# Patient Record
Sex: FEMALE | Race: ASIAN | NOT HISPANIC OR LATINO
[De-identification: names, ages, dates, MRNs, and addresses within clinical notes are randomized per-mention and may not be internally consistent; named-entity substitution may affect disease eponyms.]

---

## 2017-01-31 ENCOUNTER — LABORATORY RESULT (OUTPATIENT)
Age: 47
End: 2017-01-31

## 2017-02-01 LAB
AFP-TM SERPL-MCNC: 2.1 NG/ML
ALBUMIN SERPL ELPH-MCNC: 4.5 G/DL
ALP BLD-CCNC: 51 U/L
ALT SERPL-CCNC: 18 U/L
ANION GAP SERPL CALC-SCNC: 14 MMOL/L
AST SERPL-CCNC: 23 U/L
BASOPHILS # BLD AUTO: 0.02 K/UL
BASOPHILS NFR BLD AUTO: 0.3 %
BILIRUB SERPL-MCNC: 0.7 MG/DL
BUN SERPL-MCNC: 10 MG/DL
CALCIUM SERPL-MCNC: 9.5 MG/DL
CHLORIDE SERPL-SCNC: 100 MMOL/L
CO2 SERPL-SCNC: 25 MMOL/L
CREAT SERPL-MCNC: 0.63 MG/DL
EOSINOPHIL # BLD AUTO: 0.06 K/UL
EOSINOPHIL NFR BLD AUTO: 0.8 %
HBV SURFACE AB SER QL: NONREACTIVE
HBV SURFACE AG SER QL: REACTIVE
HCT VFR BLD CALC: 40 %
HGB BLD-MCNC: 12.5 G/DL
IMM GRANULOCYTES NFR BLD AUTO: 0.4 %
LYMPHOCYTES # BLD AUTO: 2.4 K/UL
LYMPHOCYTES NFR BLD AUTO: 30.8 %
MAN DIFF?: NORMAL
MCHC RBC-ENTMCNC: 21.2 PG
MCHC RBC-ENTMCNC: 31.3 GM/DL
MCV RBC AUTO: 67.8 FL
MONOCYTES # BLD AUTO: 0.59 K/UL
MONOCYTES NFR BLD AUTO: 7.6 %
NEUTROPHILS # BLD AUTO: 4.7 K/UL
NEUTROPHILS NFR BLD AUTO: 60.1 %
PLATELET # BLD AUTO: 246 K/UL
POTASSIUM SERPL-SCNC: 3.9 MMOL/L
PROT SERPL-MCNC: 7.6 G/DL
RBC # BLD: 5.9 M/UL
RBC # FLD: 15.6 %
SODIUM SERPL-SCNC: 139 MMOL/L
WBC # FLD AUTO: 7.8 K/UL

## 2017-02-02 ENCOUNTER — APPOINTMENT (OUTPATIENT)
Age: 47
End: 2017-02-02

## 2017-02-02 VITALS
WEIGHT: 112 LBS | TEMPERATURE: 98.5 F | DIASTOLIC BLOOD PRESSURE: 64 MMHG | HEIGHT: 65 IN | SYSTOLIC BLOOD PRESSURE: 98 MMHG | BODY MASS INDEX: 18.66 KG/M2 | RESPIRATION RATE: 14 BRPM | HEART RATE: 73 BPM

## 2017-02-03 LAB
HBV DNA # SERPL NAA+PROBE: NORMAL IU/ML
HEPB DNA PCR LOG: 4.75

## 2018-01-08 ENCOUNTER — LABORATORY RESULT (OUTPATIENT)
Age: 48
End: 2018-01-08

## 2018-01-25 ENCOUNTER — LABORATORY RESULT (OUTPATIENT)
Age: 48
End: 2018-01-25

## 2018-01-29 LAB
AFP-TM SERPL-MCNC: 2.3 NG/ML
ALBUMIN SERPL ELPH-MCNC: 4.3 G/DL
ALP BLD-CCNC: 50 U/L
ALT SERPL-CCNC: 22 U/L
ANION GAP SERPL CALC-SCNC: 14 MMOL/L
AST SERPL-CCNC: 22 U/L
BASOPHILS # BLD AUTO: 0.04 K/UL
BASOPHILS NFR BLD AUTO: 0.9 %
BILIRUB SERPL-MCNC: 0.3 MG/DL
BUN SERPL-MCNC: 10 MG/DL
CALCIUM SERPL-MCNC: 9.4 MG/DL
CHLORIDE SERPL-SCNC: 101 MMOL/L
CO2 SERPL-SCNC: 25 MMOL/L
CREAT SERPL-MCNC: 0.66 MG/DL
EOSINOPHIL # BLD AUTO: 0.04 K/UL
EOSINOPHIL NFR BLD AUTO: 0.9
HBV DNA # SERPL NAA+PROBE: NORMAL IU/ML
HBV SURFACE AB SER QL: NONREACTIVE
HBV SURFACE AG SER QL: REACTIVE
HCT VFR BLD CALC: 38 %
HEPB DNA PCR LOG: 4.33 LOGIU/ML
HGB BLD-MCNC: 12 G/DL
LYMPHOCYTES # BLD AUTO: 1.95 K/UL
LYMPHOCYTES NFR BLD AUTO: 39.1 %
MAN DIFF?: NORMAL
MCHC RBC-ENTMCNC: 21.5 PG
MCHC RBC-ENTMCNC: 31.6 GM/DL
MCV RBC AUTO: 68.1 FL
MONOCYTES # BLD AUTO: 0.21 K/UL
MONOCYTES NFR BLD AUTO: 4.3 %
NEUTROPHILS # BLD AUTO: 2.43 K/UL
NEUTROPHILS NFR BLD AUTO: 48.7 %
PLATELET # BLD AUTO: 251 K/UL
POTASSIUM SERPL-SCNC: 4.1 MMOL/L
PROT SERPL-MCNC: 7.2 G/DL
RBC # BLD: 5.58 M/UL
RBC # FLD: 15.6 %
SODIUM SERPL-SCNC: 140 MMOL/L
WBC # FLD AUTO: 4.98 K/UL

## 2018-02-05 ENCOUNTER — APPOINTMENT (OUTPATIENT)
Age: 48
End: 2018-02-05
Payer: COMMERCIAL

## 2018-02-05 VITALS
HEIGHT: 65 IN | WEIGHT: 116 LBS | TEMPERATURE: 97.9 F | BODY MASS INDEX: 19.33 KG/M2 | HEART RATE: 61 BPM | RESPIRATION RATE: 14 BRPM | SYSTOLIC BLOOD PRESSURE: 110 MMHG | DIASTOLIC BLOOD PRESSURE: 73 MMHG

## 2018-02-05 PROCEDURE — 99214 OFFICE O/P EST MOD 30 MIN: CPT

## 2018-03-05 ENCOUNTER — APPOINTMENT (OUTPATIENT)
Age: 48
End: 2018-03-05
Payer: COMMERCIAL

## 2018-03-05 PROCEDURE — 91200 LIVER ELASTOGRAPHY: CPT

## 2018-07-23 ENCOUNTER — FORM ENCOUNTER (OUTPATIENT)
Age: 48
End: 2018-07-23

## 2018-07-24 ENCOUNTER — APPOINTMENT (OUTPATIENT)
Dept: ULTRASOUND IMAGING | Facility: CLINIC | Age: 48
End: 2018-07-24
Payer: COMMERCIAL

## 2018-07-24 ENCOUNTER — OUTPATIENT (OUTPATIENT)
Dept: OUTPATIENT SERVICES | Facility: HOSPITAL | Age: 48
LOS: 1 days | End: 2018-07-24
Payer: COMMERCIAL

## 2018-07-24 DIAGNOSIS — Z00.8 ENCOUNTER FOR OTHER GENERAL EXAMINATION: ICD-10-CM

## 2018-07-24 PROCEDURE — 76700 US EXAM ABDOM COMPLETE: CPT

## 2018-07-24 PROCEDURE — 76700 US EXAM ABDOM COMPLETE: CPT | Mod: 26

## 2019-02-20 ENCOUNTER — APPOINTMENT (OUTPATIENT)
Dept: HEPATOLOGY | Facility: CLINIC | Age: 49
End: 2019-02-20

## 2019-06-16 ENCOUNTER — FORM ENCOUNTER (OUTPATIENT)
Age: 49
End: 2019-06-16

## 2019-06-17 ENCOUNTER — OUTPATIENT (OUTPATIENT)
Dept: OUTPATIENT SERVICES | Facility: HOSPITAL | Age: 49
LOS: 1 days | End: 2019-06-17
Payer: COMMERCIAL

## 2019-06-17 ENCOUNTER — APPOINTMENT (OUTPATIENT)
Dept: ULTRASOUND IMAGING | Facility: CLINIC | Age: 49
End: 2019-06-17
Payer: COMMERCIAL

## 2019-06-17 DIAGNOSIS — Z00.8 ENCOUNTER FOR OTHER GENERAL EXAMINATION: ICD-10-CM

## 2019-06-17 PROCEDURE — 76700 US EXAM ABDOM COMPLETE: CPT

## 2019-06-17 PROCEDURE — 76700 US EXAM ABDOM COMPLETE: CPT | Mod: 26

## 2019-06-18 LAB
AFP-TM SERPL-MCNC: 2 NG/ML
BASOPHILS # BLD AUTO: 0.03 K/UL
BASOPHILS NFR BLD AUTO: 0.6 %
EOSINOPHIL # BLD AUTO: 0.08 K/UL
EOSINOPHIL NFR BLD AUTO: 1.5 %
HBV SURFACE AB SER QL: NONREACTIVE
HBV SURFACE AG SER QL: REACTIVE
HCT VFR BLD CALC: 41.9 %
HGB BLD-MCNC: 12.3 G/DL
IMM GRANULOCYTES NFR BLD AUTO: 0.2 %
LYMPHOCYTES # BLD AUTO: 2.34 K/UL
LYMPHOCYTES NFR BLD AUTO: 43 %
MAN DIFF?: NORMAL
MCHC RBC-ENTMCNC: 20.8 PG
MCHC RBC-ENTMCNC: 29.4 GM/DL
MCV RBC AUTO: 70.8 FL
MONOCYTES # BLD AUTO: 0.27 K/UL
MONOCYTES NFR BLD AUTO: 5 %
NEUTROPHILS # BLD AUTO: 2.71 K/UL
NEUTROPHILS NFR BLD AUTO: 49.7 %
PLATELET # BLD AUTO: 295 K/UL
RBC # BLD: 5.92 M/UL
RBC # FLD: 17.2 %
WBC # FLD AUTO: 5.44 K/UL

## 2019-06-19 LAB
HBV DNA # SERPL NAA+PROBE: NORMAL IU/ML
HEPB DNA PCR LOG: 4.35 LOGIU/ML

## 2019-06-24 ENCOUNTER — APPOINTMENT (OUTPATIENT)
Dept: HEPATOLOGY | Facility: CLINIC | Age: 49
End: 2019-06-24
Payer: COMMERCIAL

## 2019-06-24 VITALS
BODY MASS INDEX: 18.99 KG/M2 | HEIGHT: 65 IN | TEMPERATURE: 97.5 F | SYSTOLIC BLOOD PRESSURE: 102 MMHG | RESPIRATION RATE: 14 BRPM | WEIGHT: 114 LBS | DIASTOLIC BLOOD PRESSURE: 70 MMHG | HEART RATE: 73 BPM

## 2019-06-24 LAB
ALBUMIN SERPL ELPH-MCNC: 4.3 G/DL
ALP BLD-CCNC: 49 U/L
ALT SERPL-CCNC: 15 U/L
ANION GAP SERPL CALC-SCNC: 10 MMOL/L
AST SERPL-CCNC: 13 U/L
BILIRUB SERPL-MCNC: 0.5 MG/DL
BUN SERPL-MCNC: 9 MG/DL
CALCIUM SERPL-MCNC: 8.9 MG/DL
CHLORIDE SERPL-SCNC: 104 MMOL/L
CO2 SERPL-SCNC: 26 MMOL/L
CREAT SERPL-MCNC: 0.63 MG/DL
POTASSIUM SERPL-SCNC: 4 MMOL/L
PROT SERPL-MCNC: 6.5 G/DL
SODIUM SERPL-SCNC: 140 MMOL/L

## 2019-06-24 PROCEDURE — 99214 OFFICE O/P EST MOD 30 MIN: CPT

## 2020-01-23 ENCOUNTER — APPOINTMENT (OUTPATIENT)
Dept: HEPATOLOGY | Facility: CLINIC | Age: 50
End: 2020-01-23

## 2021-01-21 ENCOUNTER — APPOINTMENT (OUTPATIENT)
Dept: HEPATOLOGY | Facility: CLINIC | Age: 51
End: 2021-01-21

## 2021-04-06 ENCOUNTER — APPOINTMENT (OUTPATIENT)
Dept: HEPATOLOGY | Facility: CLINIC | Age: 51
End: 2021-04-06
Payer: COMMERCIAL

## 2021-04-06 VITALS
RESPIRATION RATE: 10 BRPM | BODY MASS INDEX: 19.49 KG/M2 | OXYGEN SATURATION: 100 % | TEMPERATURE: 97.4 F | DIASTOLIC BLOOD PRESSURE: 62 MMHG | WEIGHT: 117 LBS | HEIGHT: 65 IN | SYSTOLIC BLOOD PRESSURE: 122 MMHG | HEART RATE: 72 BPM

## 2021-04-06 PROCEDURE — 99215 OFFICE O/P EST HI 40 MIN: CPT

## 2021-04-06 PROCEDURE — 91200 LIVER ELASTOGRAPHY: CPT

## 2021-04-06 PROCEDURE — 99072 ADDL SUPL MATRL&STAF TM PHE: CPT

## 2021-04-06 RX ORDER — YEAST,DRIED (S. CEREVISIAE)
POWDER (GRAM) ORAL
Refills: 0 | Status: ACTIVE | COMMUNITY
Start: 2021-04-06

## 2021-04-06 RX ORDER — VITAMIN E MIXED 1000 UNIT
500-200 CAPSULE ORAL
Refills: 0 | Status: ACTIVE | COMMUNITY
Start: 2021-04-06

## 2021-04-06 RX ORDER — SODIUM SULFATE, POTASSIUM SULFATE, MAGNESIUM SULFATE 17.5; 3.13; 1.6 G/ML; G/ML; G/ML
17.5-3.13-1.6 SOLUTION, CONCENTRATE ORAL
Qty: 354 | Refills: 0 | Status: DISCONTINUED | COMMUNITY
Start: 2018-01-22 | End: 2021-04-06

## 2021-04-06 RX ORDER — MULTIVITAMIN WITH FOLIC ACID 400 MCG
TABLET ORAL
Refills: 0 | Status: ACTIVE | COMMUNITY
Start: 2021-04-06

## 2021-04-06 RX ORDER — OMEGA-3-ACID ETHYL ESTERS CAPSULES 1 G/1
1 CAPSULE, LIQUID FILLED ORAL
Refills: 0 | Status: ACTIVE | COMMUNITY
Start: 2021-04-06

## 2021-04-06 NOTE — ASSESSMENT
[FreeTextEntry1] : Ms. JULIA LOBATO is 50 year old female with H/O Hepatitis BV. She is currently not taking antiviral therapy. She is doing well.\par \par # Abdominal pain – Was not tender and had no complaints today. Denies any co-relation with food, BMs or activity. She started taking Chinese herbal pill for liver health, advised to STOP taking it as he does not recollect the name of it and cannot check the ingredients for possible interactions. Advised to F/U with the PCP if the pain persists.\par \par # HBV - She is Hepatitis Be Antigen Non-Reactive and Hepatitis Be Antibody Reactive. 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 Fibroscan on 04/06/2021 shows none to Mild fibrosis F0-1 (E 3.5 kPa) with Labs done on 03/31/2021 shows HBV-DNA 40214/4.63 log, Hepatitis Be Antigen Non-Reactive and Hepatitis Be Antibody Reactive WDL- CMP, CBC, AFP 2.0, TSH. I would not start therapy at this time as the Liver enzymes are normal and FS <2.\par \par I discussed at length regarding hepatitis B. We spoke about the natural history, modes of transmission, diagnostic evaluation and treatment.  I have explained the risk of development of liver cancer and have recommended imaging every 6 months to screen for primary hepatocellular carcinoma. I have discussed at length regarding treatment. I have explained that once treatment begins, it is likely to be lifelong. I have reviewed parameters for stopping treatment. I have explained that if there is a hepatitis B surface antigen seroconversion with the development of hepatitis B surface antibody, therapy may be discontinued.\par \par # Fatty Liver - Reviewed the Severe Steatosis score of S3 ( dB/m). JULIA was educated about the fatty liver disease. Reviewed the spectrum of disease, the risk of disease progression to developing cirrhosis and the associated complications. Explained the patient may develop liver cancer without cirrhosis and therefore should be under the yearly surveillance with an abdominal ultrasound. Taught back that the best treatment of fatty liver disease is diet and exercise. Discussed the present diet with the patient and recommended the avoidance of fatty foods and to follow a heart healthy diet. Also explained that weight loss may lead to an improvement in the overall underlying liver disease. Taught back the physiology of alcohol abstinence has a important contribution to liver health. \par \par # Hepatic Cysts - US ab done on 03/29/2021 shows WDL – Except Hepatic cysts increased in size from 11 and 5 mmm in 01/25/2018 to 16 and 8 mm. Not mentioned in 2019 US Ab. Educated on the benign nature of the cyst.\par \par # Metabolic Syndrome - Reviewed the Elevated Lipids from labs done on 03/31/2021.Reviewed the spectrum of disease, the risk of disease progression with added risk factors commonly seen in patients with diabetes or those who are overweight or vice versa, a precursor to the development of diabetes as it is a component of the metabolic syndrome. Advised to F/U with PCP/Cardiology for treatment regimen.  \par \par # Immunity -  Will check for HAV Ab with next labs.\par # COL- Reported to be normal in 2019.\par \par PLAN to F/U in 6 months with repeat laboratory tests and sonogram.\par Encouraged to call back in the interim with any issues or concerns so that we can address and assist as required.

## 2021-04-06 NOTE — PHYSICAL EXAM
[Non-Tender] : non-tender [Cognitive Mini-Mental Status Normal?] : Cognitive Mini Mental Status Exam is normal [General Appearance - Alert] : alert [General Appearance - In No Acute Distress] : in no acute distress [Sclera] : the sclera and conjunctiva were normal [PERRL With Normal Accommodation] : pupils were equal in size, round, and reactive to light [Extraocular Movements] : extraocular movements were intact [Outer Ear] : the ears and nose were normal in appearance [Oropharynx] : the oropharynx was normal [Neck Appearance] : the appearance of the neck was normal [Neck Cervical Mass (___cm)] : no neck mass was observed [Jugular Venous Distention Increased] : there was no jugular-venous distention [Thyroid Diffuse Enlargement] : the thyroid was not enlarged [Thyroid Nodule] : there were no palpable thyroid nodules [Auscultation Breath Sounds / Voice Sounds] : lungs were clear to auscultation bilaterally [Heart Rate And Rhythm] : heart rate was normal and rhythm regular [Heart Sounds] : normal S1 and S2 [Heart Sounds Gallop] : no gallops [Murmurs] : no murmurs [Heart Sounds Pericardial Friction Rub] : no pericardial rub [Edema] : there was no peripheral edema [Bowel Sounds] : normal bowel sounds [Abdomen Soft] : soft [Abdomen Tenderness] : non-tender [Abdomen Mass (___ Cm)] : no abdominal mass palpated [Abnormal Walk] : normal gait [Nail Clubbing] : no clubbing  or cyanosis of the fingernails [Musculoskeletal - Swelling] : no joint swelling seen [Motor Tone] : muscle strength and tone were normal [Skin Color & Pigmentation] : normal skin color and pigmentation [Skin Turgor] : normal skin turgor [] : no rash [No Focal Deficits] : no focal deficits [Impaired Insight] : insight and judgment were intact [Oriented To Time, Place, And Person] : oriented to person, place, and time [Affect] : the affect was normal [Cervical Lymph Nodes Enlarged Posterior Bilaterally] : posterior cervical [Supraclavicular Lymph Nodes Enlarged Bilaterally] : supraclavicular [Scleral Icterus] : No Scleral Icterus [Hepatojugular Reflux] : patient did not have a sustained hepatojugular reflux [Spider Angioma] : No spider angioma(s) were observed [Abdominal Bruit] : no abdominal bruit [Abdominal  Ascites] : no ascites [Splenomegaly] : no splenomegaly [Asterixis] : no asterixis observed [Jaundice] : No jaundice [Palmar Erythema] : no Palmar Erythema

## 2021-04-06 NOTE — HISTORY OF PRESENT ILLNESS
[de-identified] : Ms. JULIA LOBATO is 50 year old female who presents for the follow up appointment with H/O Hepatitis BV. She is Hepatitis Be Antigen Non-Reactive and Hepatitis Be Antibody Reactive. She is currently not taking antiviral therapy. She is doing well.\par \par Fibroscan on 04/06/2021 shows F0-1 (E 3.5 kPa) and S3 ( dB/m). \par Fibroscan March 5, 2018 F0 S2 disease. Fibroscan test from 9/2/14 showed F0 disease.\par \par Labs done on 03/31/2021 shows HBV-DNA 54539/4.63 log, Hepatitis Be Antigen Non-Reactive and Hepatitis Be Antibody Reactive WDL- CMP,CBC,AFP 2.0,TSH, Elevated lipid panel. US ab done on 03/29/2021 shows WDL – Except Hepatic cysts increased in size from 11 and 5 mmm in 01/25/2018 to 16 and 8 mm. Not mentioned in 2019 US Ab.\par \par Blood tests June 17, 2019 platelet count 295,000, HPV DNA 87450, hepatitis B surface antigen positive. Abdominal sonogram June 17, 2019 without lesions in the liver. Alpha-fetoprotein 2\par \par Abdominal sonogram January 25, 2018 with hepatic cysts measuring up to 1.2 x 1.1 a 1.2 cm. Blood tests January 25, 2018 creatinine 0.66, ALT 22, AST 22, hepatitis B surface antigen positive, AFP 2.3, HBV DNA 69732 international units\par \par Blood tests from February 1, 2017 ALT 18, AST 23, AFP 2.1, hepatitis B surface antigen positive, HBV DNA is pending. Abdominal ultrasound from January 18, 2017 shows tiny left hepatic cyst.\par \par Blood tests December 31, 2015 hemoglobin 12.4, platelet count 270,000, ALT 17, HBV DNA 6000, alpha-fetoprotein 2.4, ferritin 49. An abdominal sonogram December 30 2015 with a small cyst in the liver measuring 1.5 x 0.9 cm and a 6 x 3 mm cyst in the left lobe of the liver. There were no other lesions noted.\par \par Blood tests July 6, 2015 hemoglobin 12.1, creatinine 0.95, ALT 17, HBV DNA 9350, alpha-fetoprotein 1.8\par Sonogram June 2015 shows no lesions in the liver, a slight reduction in the size of the hepatic cysts\par \par Blood tests from July 21, 2014 alpha-fetoprotein 1.8, hepatitis B surface antigen positive, HBV DNA 53146 international units, ALT 17, creatinine 0.6, hemoglobin 11.4, and platelet count 200,000. Ab sonogram from July 21, 2014 shows a cyst in the left lobe.\par \par Blood tests done September 2012 showed HBV DNA 5851 and an ALT of 23. A CBC with platelets is unremarkable an abdominal sonogram shows a small stable hepatic cyst but otherwise normal.\par \par

## 2021-04-06 NOTE — REVIEW OF SYSTEMS
[Negative] : Psychiatric [Abdominal Pain] : abdominal pain [Vomiting] : no vomiting [Constipation] : no constipation [Diarrhea] : no diarrhea [Heartburn] : no heartburn [Melena] : no melena [FreeTextEntry7] : Right lower quadrant

## 2021-05-26 ENCOUNTER — RESULT REVIEW (OUTPATIENT)
Age: 51
End: 2021-05-26

## 2021-09-30 ENCOUNTER — LABORATORY RESULT (OUTPATIENT)
Age: 51
End: 2021-09-30

## 2021-10-01 ENCOUNTER — NON-APPOINTMENT (OUTPATIENT)
Age: 51
End: 2021-10-01

## 2021-10-01 LAB
AFP-TM SERPL-MCNC: 2.4 NG/ML
ALBUMIN SERPL ELPH-MCNC: 4.7 G/DL
ALP BLD-CCNC: 64 U/L
ALT SERPL-CCNC: 28 U/L
ANION GAP SERPL CALC-SCNC: 13 MMOL/L
AST SERPL-CCNC: 24 U/L
BASOPHILS # BLD AUTO: 0.02 K/UL
BASOPHILS NFR BLD AUTO: 0.4 %
BILIRUB SERPL-MCNC: 0.7 MG/DL
BUN SERPL-MCNC: 11 MG/DL
CALCIUM SERPL-MCNC: 9.5 MG/DL
CHLORIDE SERPL-SCNC: 103 MMOL/L
CO2 SERPL-SCNC: 25 MMOL/L
CREAT SERPL-MCNC: 0.65 MG/DL
EOSINOPHIL # BLD AUTO: 0.07 K/UL
EOSINOPHIL NFR BLD AUTO: 1.3 %
HBV CORE IGG+IGM SER QL: REACTIVE
HBV E AB SER QL: REACTIVE
HBV E AG SER QL: NONREACTIVE
HBV SURFACE AB SER QL: NONREACTIVE
HBV SURFACE AG SER QL: REACTIVE
HCT VFR BLD CALC: 40.7 %
HEPATITIS A IGG ANTIBODY: REACTIVE
HGB BLD-MCNC: 12.3 G/DL
IMM GRANULOCYTES NFR BLD AUTO: 0.2 %
LYMPHOCYTES # BLD AUTO: 1.93 K/UL
LYMPHOCYTES NFR BLD AUTO: 36.6 %
MAN DIFF?: NORMAL
MCHC RBC-ENTMCNC: 20.6 PG
MCHC RBC-ENTMCNC: 30.2 GM/DL
MCV RBC AUTO: 68.3 FL
MONOCYTES # BLD AUTO: 0.33 K/UL
MONOCYTES NFR BLD AUTO: 6.3 %
NEUTROPHILS # BLD AUTO: 2.91 K/UL
NEUTROPHILS NFR BLD AUTO: 55.2 %
PLATELET # BLD AUTO: 238 K/UL
POTASSIUM SERPL-SCNC: 3.9 MMOL/L
PROT SERPL-MCNC: 7.5 G/DL
RBC # BLD: 5.96 M/UL
RBC # FLD: 16.3 %
SODIUM SERPL-SCNC: 141 MMOL/L
WBC # FLD AUTO: 5.27 K/UL

## 2021-10-04 LAB
HBV DNA # SERPL NAA+PROBE: NORMAL IU/ML
HEPB DNA PCR LOG: 4.82 LOG10IU/ML

## 2021-10-05 ENCOUNTER — NON-APPOINTMENT (OUTPATIENT)
Age: 51
End: 2021-10-05

## 2021-10-06 ENCOUNTER — NON-APPOINTMENT (OUTPATIENT)
Age: 51
End: 2021-10-06

## 2021-10-07 ENCOUNTER — APPOINTMENT (OUTPATIENT)
Dept: HEPATOLOGY | Facility: CLINIC | Age: 51
End: 2021-10-07
Payer: COMMERCIAL

## 2021-10-07 VITALS
HEART RATE: 93 BPM | DIASTOLIC BLOOD PRESSURE: 77 MMHG | BODY MASS INDEX: 19.83 KG/M2 | HEIGHT: 65 IN | WEIGHT: 119 LBS | TEMPERATURE: 97.6 F | RESPIRATION RATE: 10 BRPM | SYSTOLIC BLOOD PRESSURE: 111 MMHG

## 2021-10-07 PROCEDURE — 99214 OFFICE O/P EST MOD 30 MIN: CPT

## 2021-12-16 ENCOUNTER — APPOINTMENT (OUTPATIENT)
Dept: HEPATOLOGY | Facility: CLINIC | Age: 51
End: 2021-12-16

## 2022-06-10 ENCOUNTER — INPATIENT (INPATIENT)
Facility: HOSPITAL | Age: 52
LOS: 1 days | Discharge: HOME | DRG: 690 | End: 2022-06-12
Attending: STUDENT IN AN ORGANIZED HEALTH CARE EDUCATION/TRAINING PROGRAM | Admitting: STUDENT IN AN ORGANIZED HEALTH CARE EDUCATION/TRAINING PROGRAM
Payer: COMMERCIAL

## 2022-06-10 VITALS
TEMPERATURE: 98 F | WEIGHT: 117.95 LBS | HEART RATE: 88 BPM | SYSTOLIC BLOOD PRESSURE: 113 MMHG | RESPIRATION RATE: 18 BRPM | HEIGHT: 61 IN | DIASTOLIC BLOOD PRESSURE: 77 MMHG

## 2022-06-10 PROCEDURE — 99285 EMERGENCY DEPT VISIT HI MDM: CPT

## 2022-06-11 DIAGNOSIS — N30.00 ACUTE CYSTITIS WITHOUT HEMATURIA: ICD-10-CM

## 2022-06-11 LAB
ALBUMIN SERPL ELPH-MCNC: 4.5 G/DL — SIGNIFICANT CHANGE UP (ref 3.3–5)
ALP SERPL-CCNC: 76 U/L — SIGNIFICANT CHANGE UP (ref 40–120)
ALT FLD-CCNC: 22 U/L — SIGNIFICANT CHANGE UP (ref 10–45)
ANION GAP SERPL CALC-SCNC: 13 MMOL/L — SIGNIFICANT CHANGE UP (ref 5–17)
ANISOCYTOSIS BLD QL: SLIGHT — SIGNIFICANT CHANGE UP
APPEARANCE UR: ABNORMAL
AST SERPL-CCNC: 19 U/L — SIGNIFICANT CHANGE UP (ref 10–40)
BACTERIA # UR AUTO: NEGATIVE — SIGNIFICANT CHANGE UP
BASE EXCESS BLDV CALC-SCNC: 1.3 MMOL/L — SIGNIFICANT CHANGE UP (ref -2–2)
BASE EXCESS BLDV CALC-SCNC: 4.9 MMOL/L — HIGH (ref -2–2)
BASOPHILS # BLD AUTO: 0 K/UL — SIGNIFICANT CHANGE UP (ref 0–0.2)
BASOPHILS NFR BLD AUTO: 0 % — SIGNIFICANT CHANGE UP (ref 0–2)
BILIRUB SERPL-MCNC: 0.8 MG/DL — SIGNIFICANT CHANGE UP (ref 0.2–1.2)
BILIRUB UR-MCNC: NEGATIVE — SIGNIFICANT CHANGE UP
BUN SERPL-MCNC: 9 MG/DL — SIGNIFICANT CHANGE UP (ref 7–23)
CA-I SERPL-SCNC: 1.23 MMOL/L — SIGNIFICANT CHANGE UP (ref 1.15–1.33)
CA-I SERPL-SCNC: 1.24 MMOL/L — SIGNIFICANT CHANGE UP (ref 1.15–1.33)
CALCIUM SERPL-MCNC: 9.3 MG/DL — SIGNIFICANT CHANGE UP (ref 8.4–10.5)
CHLORIDE BLDV-SCNC: 104 MMOL/L — SIGNIFICANT CHANGE UP (ref 96–108)
CHLORIDE BLDV-SCNC: 105 MMOL/L — SIGNIFICANT CHANGE UP (ref 96–108)
CHLORIDE SERPL-SCNC: 101 MMOL/L — SIGNIFICANT CHANGE UP (ref 96–108)
CO2 BLDV-SCNC: 28 MMOL/L — HIGH (ref 22–26)
CO2 BLDV-SCNC: 30 MMOL/L — HIGH (ref 22–26)
CO2 SERPL-SCNC: 25 MMOL/L — SIGNIFICANT CHANGE UP (ref 22–31)
COLOR SPEC: ABNORMAL
CREAT SERPL-MCNC: 0.56 MG/DL — SIGNIFICANT CHANGE UP (ref 0.5–1.3)
DACRYOCYTES BLD QL SMEAR: SLIGHT — SIGNIFICANT CHANGE UP
DIFF PNL FLD: ABNORMAL
EGFR: 110 ML/MIN/1.73M2 — SIGNIFICANT CHANGE UP
ELLIPTOCYTES BLD QL SMEAR: SLIGHT — SIGNIFICANT CHANGE UP
EOSINOPHIL # BLD AUTO: 0 K/UL — SIGNIFICANT CHANGE UP (ref 0–0.5)
EOSINOPHIL NFR BLD AUTO: 0 % — SIGNIFICANT CHANGE UP (ref 0–6)
EPI CELLS # UR: 2 /HPF — SIGNIFICANT CHANGE UP
GAS PNL BLDV: 137 MMOL/L — SIGNIFICANT CHANGE UP (ref 136–145)
GAS PNL BLDV: 137 MMOL/L — SIGNIFICANT CHANGE UP (ref 136–145)
GAS PNL BLDV: SIGNIFICANT CHANGE UP
GAS PNL BLDV: SIGNIFICANT CHANGE UP
GLUCOSE BLDV-MCNC: 118 MG/DL — HIGH (ref 70–99)
GLUCOSE BLDV-MCNC: 130 MG/DL — HIGH (ref 70–99)
GLUCOSE SERPL-MCNC: 107 MG/DL — HIGH (ref 70–99)
GLUCOSE UR QL: NEGATIVE — SIGNIFICANT CHANGE UP
HCO3 BLDV-SCNC: 27 MMOL/L — SIGNIFICANT CHANGE UP (ref 22–29)
HCO3 BLDV-SCNC: 29 MMOL/L — SIGNIFICANT CHANGE UP (ref 22–29)
HCT VFR BLD CALC: 38.5 % — SIGNIFICANT CHANGE UP (ref 34.5–45)
HCT VFR BLDA CALC: 32 % — LOW (ref 34.5–46.5)
HCT VFR BLDA CALC: 33 % — LOW (ref 34.5–46.5)
HGB BLD CALC-MCNC: 10.6 G/DL — LOW (ref 11.7–16.1)
HGB BLD CALC-MCNC: 10.9 G/DL — LOW (ref 11.7–16.1)
HGB BLD-MCNC: 11.8 G/DL — SIGNIFICANT CHANGE UP (ref 11.5–15.5)
HYALINE CASTS # UR AUTO: 1 /LPF — SIGNIFICANT CHANGE UP (ref 0–7)
KETONES UR-MCNC: SIGNIFICANT CHANGE UP
LACTATE BLDV-MCNC: 1.1 MMOL/L — SIGNIFICANT CHANGE UP (ref 0.7–2)
LACTATE BLDV-MCNC: 3.1 MMOL/L — HIGH (ref 0.7–2)
LEUKOCYTE ESTERASE UR-ACNC: ABNORMAL
LYMPHOCYTES # BLD AUTO: 1.88 K/UL — SIGNIFICANT CHANGE UP (ref 1–3.3)
LYMPHOCYTES # BLD AUTO: 15.8 % — SIGNIFICANT CHANGE UP (ref 13–44)
MANUAL SMEAR VERIFICATION: SIGNIFICANT CHANGE UP
MCHC RBC-ENTMCNC: 20.7 PG — LOW (ref 27–34)
MCHC RBC-ENTMCNC: 30.6 GM/DL — LOW (ref 32–36)
MCV RBC AUTO: 67.5 FL — LOW (ref 80–100)
MICROCYTES BLD QL: SIGNIFICANT CHANGE UP
MONOCYTES # BLD AUTO: 0.83 K/UL — SIGNIFICANT CHANGE UP (ref 0–0.9)
MONOCYTES NFR BLD AUTO: 7 % — SIGNIFICANT CHANGE UP (ref 2–14)
MYELOCYTES NFR BLD: 0.9 % — HIGH (ref 0–0)
NEUTROPHILS # BLD AUTO: 9.06 K/UL — HIGH (ref 1.8–7.4)
NEUTROPHILS NFR BLD AUTO: 73.7 % — SIGNIFICANT CHANGE UP (ref 43–77)
NEUTS BAND # BLD: 2.6 % — SIGNIFICANT CHANGE UP (ref 0–8)
NITRITE UR-MCNC: NEGATIVE — SIGNIFICANT CHANGE UP
OVALOCYTES BLD QL SMEAR: SLIGHT — SIGNIFICANT CHANGE UP
PCO2 BLDV: 41 MMHG — SIGNIFICANT CHANGE UP (ref 39–42)
PCO2 BLDV: 44 MMHG — HIGH (ref 39–42)
PH BLDV: 7.39 — SIGNIFICANT CHANGE UP (ref 7.32–7.43)
PH BLDV: 7.46 — HIGH (ref 7.32–7.43)
PH UR: 7 — SIGNIFICANT CHANGE UP (ref 5–8)
PLAT MORPH BLD: NORMAL — SIGNIFICANT CHANGE UP
PLATELET # BLD AUTO: 229 K/UL — SIGNIFICANT CHANGE UP (ref 150–400)
PO2 BLDV: 33 MMHG — SIGNIFICANT CHANGE UP (ref 25–45)
PO2 BLDV: 54 MMHG — HIGH (ref 25–45)
POIKILOCYTOSIS BLD QL AUTO: SLIGHT — SIGNIFICANT CHANGE UP
POLYCHROMASIA BLD QL SMEAR: SLIGHT — SIGNIFICANT CHANGE UP
POTASSIUM BLDV-SCNC: 3.8 MMOL/L — SIGNIFICANT CHANGE UP (ref 3.5–5.1)
POTASSIUM BLDV-SCNC: 4.2 MMOL/L — SIGNIFICANT CHANGE UP (ref 3.5–5.1)
POTASSIUM SERPL-MCNC: 3.6 MMOL/L — SIGNIFICANT CHANGE UP (ref 3.5–5.3)
POTASSIUM SERPL-SCNC: 3.6 MMOL/L — SIGNIFICANT CHANGE UP (ref 3.5–5.3)
PROT SERPL-MCNC: 7.1 G/DL — SIGNIFICANT CHANGE UP (ref 6–8.3)
PROT UR-MCNC: ABNORMAL
RBC # BLD: 5.7 M/UL — HIGH (ref 3.8–5.2)
RBC # FLD: 15.2 % — HIGH (ref 10.3–14.5)
RBC BLD AUTO: ABNORMAL
RBC CASTS # UR COMP ASSIST: 2261 /HPF — HIGH (ref 0–4)
SAO2 % BLDV: 53.8 % — LOW (ref 67–88)
SAO2 % BLDV: 85.2 % — SIGNIFICANT CHANGE UP (ref 67–88)
SARS-COV-2 RNA SPEC QL NAA+PROBE: SIGNIFICANT CHANGE UP
SODIUM SERPL-SCNC: 139 MMOL/L — SIGNIFICANT CHANGE UP (ref 135–145)
SP GR SPEC: 1.02 — SIGNIFICANT CHANGE UP (ref 1.01–1.02)
UROBILINOGEN FLD QL: NEGATIVE — SIGNIFICANT CHANGE UP
WBC # BLD: 11.87 K/UL — HIGH (ref 3.8–10.5)
WBC # FLD AUTO: 11.87 K/UL — HIGH (ref 3.8–10.5)
WBC UR QL: 2064 /HPF — HIGH (ref 0–5)

## 2022-06-11 PROCEDURE — 74177 CT ABD & PELVIS W/CONTRAST: CPT | Mod: 26,MA

## 2022-06-11 RX ORDER — LANOLIN ALCOHOL/MO/W.PET/CERES
3 CREAM (GRAM) TOPICAL AT BEDTIME
Refills: 0 | Status: DISCONTINUED | OUTPATIENT
Start: 2022-06-11 | End: 2022-06-12

## 2022-06-11 RX ORDER — PHENAZOPYRIDINE HCL 100 MG
100 TABLET ORAL ONCE
Refills: 0 | Status: DISCONTINUED | OUTPATIENT
Start: 2022-06-11 | End: 2022-06-11

## 2022-06-11 RX ORDER — CEFTRIAXONE 500 MG/1
1000 INJECTION, POWDER, FOR SOLUTION INTRAMUSCULAR; INTRAVENOUS ONCE
Refills: 0 | Status: COMPLETED | OUTPATIENT
Start: 2022-06-11 | End: 2022-06-11

## 2022-06-11 RX ORDER — SODIUM CHLORIDE 9 MG/ML
1000 INJECTION INTRAMUSCULAR; INTRAVENOUS; SUBCUTANEOUS ONCE
Refills: 0 | Status: COMPLETED | OUTPATIENT
Start: 2022-06-11 | End: 2022-06-11

## 2022-06-11 RX ORDER — SODIUM CHLORIDE 9 MG/ML
1000 INJECTION, SOLUTION INTRAVENOUS ONCE
Refills: 0 | Status: COMPLETED | OUTPATIENT
Start: 2022-06-11 | End: 2022-06-11

## 2022-06-11 RX ORDER — SODIUM CHLORIDE 9 MG/ML
1000 INJECTION INTRAMUSCULAR; INTRAVENOUS; SUBCUTANEOUS
Refills: 0 | Status: DISCONTINUED | OUTPATIENT
Start: 2022-06-11 | End: 2022-06-12

## 2022-06-11 RX ORDER — PHENAZOPYRIDINE HCL 100 MG
200 TABLET ORAL ONCE
Refills: 0 | Status: COMPLETED | OUTPATIENT
Start: 2022-06-11 | End: 2022-06-11

## 2022-06-11 RX ORDER — ONDANSETRON 8 MG/1
4 TABLET, FILM COATED ORAL EVERY 8 HOURS
Refills: 0 | Status: DISCONTINUED | OUTPATIENT
Start: 2022-06-11 | End: 2022-06-12

## 2022-06-11 RX ORDER — ACETAMINOPHEN 500 MG
650 TABLET ORAL EVERY 6 HOURS
Refills: 0 | Status: DISCONTINUED | OUTPATIENT
Start: 2022-06-11 | End: 2022-06-12

## 2022-06-11 RX ORDER — CEFTRIAXONE 500 MG/1
1000 INJECTION, POWDER, FOR SOLUTION INTRAMUSCULAR; INTRAVENOUS EVERY 24 HOURS
Refills: 0 | Status: DISCONTINUED | OUTPATIENT
Start: 2022-06-11 | End: 2022-06-12

## 2022-06-11 RX ADMIN — SODIUM CHLORIDE 1000 MILLILITER(S): 9 INJECTION, SOLUTION INTRAVENOUS at 07:00

## 2022-06-11 RX ADMIN — SODIUM CHLORIDE 1000 MILLILITER(S): 9 INJECTION, SOLUTION INTRAVENOUS at 05:46

## 2022-06-11 RX ADMIN — CEFTRIAXONE 1000 MILLIGRAM(S): 500 INJECTION, POWDER, FOR SOLUTION INTRAMUSCULAR; INTRAVENOUS at 07:00

## 2022-06-11 RX ADMIN — SODIUM CHLORIDE 1000 MILLILITER(S): 9 INJECTION INTRAMUSCULAR; INTRAVENOUS; SUBCUTANEOUS at 00:55

## 2022-06-11 RX ADMIN — SODIUM CHLORIDE 1000 MILLILITER(S): 9 INJECTION INTRAMUSCULAR; INTRAVENOUS; SUBCUTANEOUS at 01:55

## 2022-06-11 RX ADMIN — CEFTRIAXONE 100 MILLIGRAM(S): 500 INJECTION, POWDER, FOR SOLUTION INTRAMUSCULAR; INTRAVENOUS at 03:13

## 2022-06-11 RX ADMIN — SODIUM CHLORIDE 75 MILLILITER(S): 9 INJECTION INTRAMUSCULAR; INTRAVENOUS; SUBCUTANEOUS at 14:05

## 2022-06-11 RX ADMIN — Medication 200 MILLIGRAM(S): at 03:13

## 2022-06-11 NOTE — H&P ADULT - NSHPPHYSICALEXAM_GEN_ALL_CORE
GENERAL: NAD, AAOx3  HEAD:  Atraumatic, Normocephalic  EYES: EOMI, PERRLA, conjunctiva and sclera clear  NECK: Supple, No JVD, No LAD  CHEST/LUNG: CTABL, No wheeze  HEART: Regular rate and rhythm; + murmur  ABDOMEN: Soft, Nontender, Nondistended; Bowel sounds present  EXTREMITIES:  2+ Peripheral Pulses, No clubbing, cyanosis, or edema  SKIN: No rashes or lesions

## 2022-06-11 NOTE — ED PROVIDER NOTE - OBJECTIVE STATEMENT
52 year old F with no significant PMH presenting with urinary symptoms x1 day. Patient first noticed mild dysuria yesterday. Today, started having increased frequency, worsening dysuria, pain with urination and hematuria. Also developed shaking chills. Denies fevers, N/V/D, CP, SOB, back/flank pain.

## 2022-06-11 NOTE — ED PROVIDER NOTE - PHYSICAL EXAMINATION
GENERAL: Awake, alert, NAD  HEENT: NC/AT, moist mucous membranes, PERRL, EOMI  LUNGS: CTAB, no wheezes or crackles   CARDIAC: RRR, no m/r/g  ABDOMEN: Soft, normal BS, non tender, non distended, no rebound, no guarding. Mild tenderness to suprapubic area.   BACK: No midline spinal tenderness, no CVA tenderness  EXT: No edema, no calf tenderness, 2+ DP pulses bilaterally, no deformities.  NEURO: A&Ox3. Moving all extremities.  SKIN: Warm and dry. No rash.  PSYCH: Normal affect.

## 2022-06-11 NOTE — CONSULT NOTE ADULT - ASSESSMENT
Patient is a 52 year old female with no significant PMH presenting with worsening dysuria, urinary frequency, and hematuria who was admitted for acute UTI.     Acute UTI/cystitis   Leukocytosis due to above    - worsening urinary symptoms with hematuria, some chills, no CVA tenderness, mild suprapubic tenderness    - urinalysis with significant pyuria, large LE, negative nitrites and bacteria   - CTAP with cystitis with no e/o pyelonephritis    - follow urine and blood cultures    - continue on ceftriaxone 1g IV Q24h   - monitor temps/WBC      D/w Dr. Wayne Abdullahi M.D.  [facility], Division of Infectious Diseases  [phone]  After 5pm on weekdays and all day on weekends - please call [phone]

## 2022-06-11 NOTE — ED PROVIDER NOTE - ATTENDING CONTRIBUTION TO CARE
I, Justina Johnson, performed a history and physical exam of the patient and discussed their management with the resident and /or advanced care provider. I reviewed the resident and /or ACP's note and agree with the documented findings and plan of care except where otherwise noted in my note. I was present and available for all procedures.     51 yo F with no pmh p/w dysuria yesterday with development of urinary frequency, hematuria and chills today. no fever, no back or flank pain. no n/v.     PHYSICAL EXAM:   General: well-appearing, appears stated age, not in extremis   HEENT: NC/AT, airway patent  Cardiovascular: regular rate  Respiratory: nonlabored respirations  Abdominal: soft, mild suprapubic tenderness to palpation, nondistended, no rebound, guarding or rigidity  Back: no costovertebral tenderness, no rashes noted  Neuro: Alert and oriented x3.   Psychiatric: appropriate mood and affect.   -Justina Johnson MD Attending Physician     concern for pyelo, low suspicion renal stone. no cocnern aortic pathology or acute surgical pathology in the abdomen. labs, UA, IVF, reassess.

## 2022-06-11 NOTE — ED PROVIDER NOTE - PROGRESS NOTE DETAILS
UA positive, concern for pyelo given wbc and chills. will give ctx Justina Johnson MD Attending Physician- after CTX and pyridium, patient started to feel diaphoretic, with b/l hand contractures, numbness and diffuse abdominal pain. BP 80 systolic. no sob. no chest pain. will get CT abd/pelvis with iv contrast, highest suspicion for sepsis, cultures added  low concern for aortic pathology at this time

## 2022-06-11 NOTE — CONSULT NOTE ADULT - SUBJECTIVE AND OBJECTIVE BOX
[facility], Division of Infectious Diseases  DAMIEN Islas S. Shah, Y. Patel, G. Casimir  [phone]  ([phone] - weekdays after 5pm and weekends)    JULIA LOBATO  52y, Female  [id number]    HPI:  Patient is a 52 year old female with no significant PMH presenting with urinary frequency, pain and hematuria x1 day. Patient states she felt mild dysuria 2 days ago and then noticed a sudden worsening yesterday around 5pm, she has been having increased frequency and suprapubic discomfort. She has noticed some blood at the end of urination. She felt chills in the hospital, no fever or chills at home. Denies nausea, vomiting, diarrhea, back pain, flank pain. Denies dyspnea, cough, chest pain or any other complaints. She denies any history of UTIs in the past. She states she is feeling better now. Patient seen and examined at bedside this morning in the ER.   ROS: 14 point review of systems completed, pertinent positives and negatives as per HPI.    Allergies: No Known Allergies    PMH -- Otitis media  PSH -- FH -- Family history of heart disease (Mother)  Family history of colon cancer (Sibling)  Social History -- denies tobacco, alcohol or illicit drug use    Physical Exam--  Vital Signs Last 24 Hrs  T(F): 98.7 (2022 11:01), Max: 99.1 (2022 05:51)  HR: 60 (2022 11:01) (60 - 88)  BP: 98/61 (2022 11:01) (86/62 - 113/77)  RR: 18 (2022 11:01) (16 - 24)  SpO2: 95% (2022 11:01) (94% - 98%)  Physical Examination:  General: no acute distress  HEENT: NC/AT, anicteric, neck supple  Respiratory: clear to auscultation bilaterally  Cardiovascular: S1 S2 present, normal rate/rhythm  Gastrointestinal: soft, nontender, nondistended  Back: no spinal/paraspinal tenderness, no CVA tenderness  Neuro: AAOx3, no obvious focal deficits  Extremities: no edema, no cyanosis  Skin: warm, dry, no visible rash  Psych: appropriate affect and mood for situation  Lines: PIV      Laboratory & Imaging Data--  CBC:                       11.8   11.87 )-----------( 229      ( 2022 01:14 )             38.5     WBC Count: 11.87 K/uL (22 @ 01:14)    CMP:     139  |  101  |  9   ----------------------------<  107<H>  3.6   |  25  |  0.56    Ca    9.3      2022 01:14    TPro  7.1  /  Alb  4.5  /  TBili  0.8  /  DBili  x   /  AST  19  /  ALT  22  /  AlkPhos  76  0611    LIVER FUNCTIONS - ( 2022 01:14 )  Alb: 4.5 g/dL / Pro: 7.1 g/dL / ALK PHOS: 76 U/L / ALT: 22 U/L / AST: 19 U/L / GGT: x           Urinalysis Basic - ( 2022 01:17 )  Color: Light Orange / Appearance: Turbid / S.016 / pH: x  Gluc: x / Ketone: Trace  / Bili: Negative / Urobili: Negative   Blood: x / Protein: 100 mg/dL / Nitrite: Negative   Leuk Esterase: Large / RBC: 2261 /hpf / WBC 2064 /HPF   Sq Epi: x / Non Sq Epi: 2 /hpf / Bacteria: Negative    Microbiology: reviewed, cx pending  Radiology--reviewed   CT Abdomen and Pelvis w/ IV Cont (22 @ 05:21) >IMPRESSION: Cystitis. Correlate with urinalysis. No evidence of pyelonephritis.    Active Medications--  acetaminophen     Tablet .. 650 milliGRAM(s) Oral every 6 hours PRN  aluminum hydroxide/magnesium hydroxide/simethicone Suspension 30 milliLiter(s) Oral every 4 hours PRN  cefTRIAXone   IVPB 1000 milliGRAM(s) IV Intermittent every 24 hours  melatonin 3 milliGRAM(s) Oral at bedtime PRN  ondansetron Injectable 4 milliGRAM(s) IV Push every 8 hours PRN  sodium chloride 0.9%. 1000 milliLiter(s) IV Continuous <Continuous>    Antimicrobials:   cefTRIAXone   IVPB 1000 milliGRAM(s) IV Intermittent every 24 hours

## 2022-06-11 NOTE — ED ADULT NURSE REASSESSMENT NOTE - NS ED NURSE REASSESS COMMENT FT1
Pt stated that she was feeling cramping abd pain. Pt began to hyperventilate. Pt moved into a room and coached on point breathing. Pt  called and arrived to help calm pt. Pt reports that she feels better, and is instructed to continue to breath deeply.

## 2022-06-11 NOTE — ED PROVIDER NOTE - CLINICAL SUMMARY MEDICAL DECISION MAKING FREE TEXT BOX
52 year old F with no significant PMH presenting with urinary symptoms x1 day. VSS, afebrile, uncomfortable appearing. Mild suprapubic tenderness. No flank pain. Given chills, concern for pyelo. Will get labs, UA/UC. Reassess.

## 2022-06-11 NOTE — H&P ADULT - NSHPREVIEWOFSYSTEMS_GEN_ALL_CORE
REVIEW OF SYSTEMS    General:	Denies fatigue, fevers, chills, sweats, decreased appetite.    Skin/Breast: denies pruritis, rash  	  Ophthalmologic: no change in vision or blurring  	  HEENT	Denies dry mouth, oral sores, dysphagia,  change in hearing.    Respiratory and Thorax:  cough, sob, wheeze, hemoptysis  	  Cardiovascular:	no cp , palp, orthopnea    Gastrointestinal:	no n/v/d constipation    Genitourinary:	+ dysuria and frequency, + hematuria, no flank pain    Musculoskeletal:	no bone or joint pain. no muscle aches    Neurological:	no change in sensory or motor function. no headache. no weakness.     Psychiatric:	no depression, no anxiety, insomnia.     Hematology/Lymphatics:	no bleeding or bruising

## 2022-06-11 NOTE — ED ADULT NURSE REASSESSMENT NOTE - NS ED NURSE REASSESS COMMENT FT1
Pt received at 7am alert and orientedX4. No distress. Breathing easy and non labored. Pt ambulatory.

## 2022-06-11 NOTE — H&P ADULT - ASSESSMENT
52 year old F with no significant PMH presenting with urinary frequency, pain and hematuria x1 day. admitted for UTI.    # UTI  # hematuria  CT reviewed  fu urine cx, cont ceftriaxone  cont IVF, monitor hemodynamically, concern for urosepsis  ID consult  OOB    # murmur  pt states she knows she has murmur and had TTE outpt and was told it was stable  cont outpt fu    dvt ppx ipc early ambulation low risk    [facility] [phone].

## 2022-06-11 NOTE — H&P ADULT - NSHPLABSRESULTS_GEN_ALL_CORE
LABS:                        11.8   11.87 )-----------( 229      ( 2022 01:14 )             38.5     06-11    139  |  101  |  9   ----------------------------<  107<H>  3.6   |  25  |  0.56    Ca    9.3      2022 01:14    TPro  7.1  /  Alb  4.5  /  TBili  0.8  /  DBili  x   /  AST  19  /  ALT  22  /  AlkPhos  76  06-11      CAPILLARY BLOOD GLUCOSE            Urinalysis Basic - ( 2022 01:17 )    Color: Light Orange / Appearance: Turbid / S.016 / pH: x  Gluc: x / Ketone: Trace  / Bili: Negative / Urobili: Negative   Blood: x / Protein: 100 mg/dL / Nitrite: Negative   Leuk Esterase: Large / RBC: 2261 /hpf / WBC 2064 /HPF   Sq Epi: x / Non Sq Epi: 2 /hpf / Bacteria: Negative        RADIOLOGY & ADDITIONAL TESTS:    Imaging Personally Reviewed:  [x] YES  [ ] NO    Consultant(s) Notes Reviewed:  [x] YES  [ ] NO    Care Discussed with Consultants/Other Providers [x] YES  [ ] NO

## 2022-06-11 NOTE — ED PROVIDER NOTE - NS ED ROS FT
CONST: no fevers, no chills  EYES: no pain, no vision changes  ENT: no sore throat, no ear pain, no change in hearing  CV: no chest pain, no leg swelling  RESP: no shortness of breath, no cough  ABD: no abdominal pain, no nausea, no vomiting, no diarrhea  : + dysuria, no flank pain, + hematuria  MSK: no back pain, no extremity pain  NEURO: no headache or additional neurologic complaints  HEME: no easy bleeding  SKIN:  no rash

## 2022-06-11 NOTE — H&P ADULT - HISTORY OF PRESENT ILLNESS
52 year old F with no significant PMH presenting with urinary frequency, pain and hematuria x1 day. Patient first noticed mild dysuria yesterday. Today, started having increased frequency, worsening dysuria, pain with urination and hematuria. Also developed shaking chills. Denies fevers, N/V/D, CP, SOB, back/flank pain. Pt presented to ED. In ED, pt experienced some flushing,  52 year old F with no significant PMH presenting with urinary frequency, pain and hematuria x1 day. Patient first noticed mild dysuria yesterday. Today, started having increased frequency, worsening dysuria, pain with urination and hematuria. Also developed shaking chills. Denies fevers, N/V/D, CP, SOB, back/flank pain. Pt presented to ED. In ED, pt experienced some flushing and shaking. pt feels better now.

## 2022-06-11 NOTE — ED PROVIDER NOTE - NSICDXFAMILYHX_GEN_ALL_CORE_FT
FAMILY HISTORY:  Mother  Still living? Yes, Estimated age: Age Unknown  Family history of heart disease, Age at diagnosis: Age Unknown    Sibling  Still living? No  Family history of colon cancer, Age at diagnosis: Age Unknown

## 2022-06-12 ENCOUNTER — TRANSCRIPTION ENCOUNTER (OUTPATIENT)
Age: 52
End: 2022-06-12

## 2022-06-12 VITALS
HEART RATE: 63 BPM | DIASTOLIC BLOOD PRESSURE: 66 MMHG | RESPIRATION RATE: 18 BRPM | TEMPERATURE: 98 F | OXYGEN SATURATION: 96 % | SYSTOLIC BLOOD PRESSURE: 97 MMHG

## 2022-06-12 LAB
ANION GAP SERPL CALC-SCNC: 10 MMOL/L — SIGNIFICANT CHANGE UP (ref 5–17)
BUN SERPL-MCNC: 9 MG/DL — SIGNIFICANT CHANGE UP (ref 7–23)
CALCIUM SERPL-MCNC: 8.6 MG/DL — SIGNIFICANT CHANGE UP (ref 8.4–10.5)
CHLORIDE SERPL-SCNC: 106 MMOL/L — SIGNIFICANT CHANGE UP (ref 96–108)
CO2 SERPL-SCNC: 23 MMOL/L — SIGNIFICANT CHANGE UP (ref 22–31)
CREAT SERPL-MCNC: 0.56 MG/DL — SIGNIFICANT CHANGE UP (ref 0.5–1.3)
EGFR: 110 ML/MIN/1.73M2 — SIGNIFICANT CHANGE UP
GLUCOSE SERPL-MCNC: 103 MG/DL — HIGH (ref 70–99)
HCT VFR BLD CALC: 34.4 % — LOW (ref 34.5–45)
HGB BLD-MCNC: 10.5 G/DL — LOW (ref 11.5–15.5)
MCHC RBC-ENTMCNC: 21.1 PG — LOW (ref 27–34)
MCHC RBC-ENTMCNC: 30.5 GM/DL — LOW (ref 32–36)
MCV RBC AUTO: 69.1 FL — LOW (ref 80–100)
NRBC # BLD: 0 /100 WBCS — SIGNIFICANT CHANGE UP (ref 0–0)
PLATELET # BLD AUTO: 191 K/UL — SIGNIFICANT CHANGE UP (ref 150–400)
POTASSIUM SERPL-MCNC: 3.5 MMOL/L — SIGNIFICANT CHANGE UP (ref 3.5–5.3)
POTASSIUM SERPL-SCNC: 3.5 MMOL/L — SIGNIFICANT CHANGE UP (ref 3.5–5.3)
RBC # BLD: 4.98 M/UL — SIGNIFICANT CHANGE UP (ref 3.8–5.2)
RBC # FLD: 15.5 % — HIGH (ref 10.3–14.5)
SODIUM SERPL-SCNC: 139 MMOL/L — SIGNIFICANT CHANGE UP (ref 135–145)
WBC # BLD: 5.88 K/UL — SIGNIFICANT CHANGE UP (ref 3.8–10.5)
WBC # FLD AUTO: 5.88 K/UL — SIGNIFICANT CHANGE UP (ref 3.8–10.5)

## 2022-06-12 PROCEDURE — 85018 HEMOGLOBIN: CPT

## 2022-06-12 PROCEDURE — 82803 BLOOD GASES ANY COMBINATION: CPT

## 2022-06-12 PROCEDURE — 84132 ASSAY OF SERUM POTASSIUM: CPT

## 2022-06-12 PROCEDURE — 80048 BASIC METABOLIC PNL TOTAL CA: CPT

## 2022-06-12 PROCEDURE — 74177 CT ABD & PELVIS W/CONTRAST: CPT | Mod: MA

## 2022-06-12 PROCEDURE — 87086 URINE CULTURE/COLONY COUNT: CPT

## 2022-06-12 PROCEDURE — 85027 COMPLETE CBC AUTOMATED: CPT

## 2022-06-12 PROCEDURE — 84295 ASSAY OF SERUM SODIUM: CPT

## 2022-06-12 PROCEDURE — U0003: CPT

## 2022-06-12 PROCEDURE — 85025 COMPLETE CBC W/AUTO DIFF WBC: CPT

## 2022-06-12 PROCEDURE — 82565 ASSAY OF CREATININE: CPT

## 2022-06-12 PROCEDURE — 96366 THER/PROPH/DIAG IV INF ADDON: CPT

## 2022-06-12 PROCEDURE — 99285 EMERGENCY DEPT VISIT HI MDM: CPT | Mod: 25

## 2022-06-12 PROCEDURE — 82947 ASSAY GLUCOSE BLOOD QUANT: CPT

## 2022-06-12 PROCEDURE — 81001 URINALYSIS AUTO W/SCOPE: CPT

## 2022-06-12 PROCEDURE — 83605 ASSAY OF LACTIC ACID: CPT

## 2022-06-12 PROCEDURE — 96365 THER/PROPH/DIAG IV INF INIT: CPT

## 2022-06-12 PROCEDURE — U0005: CPT

## 2022-06-12 PROCEDURE — 87186 SC STD MICRODIL/AGAR DIL: CPT

## 2022-06-12 PROCEDURE — 87040 BLOOD CULTURE FOR BACTERIA: CPT

## 2022-06-12 PROCEDURE — 82330 ASSAY OF CALCIUM: CPT

## 2022-06-12 PROCEDURE — 96361 HYDRATE IV INFUSION ADD-ON: CPT

## 2022-06-12 PROCEDURE — 80053 COMPREHEN METABOLIC PANEL: CPT

## 2022-06-12 PROCEDURE — 85014 HEMATOCRIT: CPT

## 2022-06-12 PROCEDURE — 82435 ASSAY OF BLOOD CHLORIDE: CPT

## 2022-06-12 PROCEDURE — G0378: CPT

## 2022-06-12 RX ORDER — CEFPODOXIME PROXETIL 100 MG
1 TABLET ORAL
Qty: 6 | Refills: 0
Start: 2022-06-12 | End: 2022-06-14

## 2022-06-12 RX ADMIN — CEFTRIAXONE 100 MILLIGRAM(S): 500 INJECTION, POWDER, FOR SOLUTION INTRAMUSCULAR; INTRAVENOUS at 03:41

## 2022-06-12 NOTE — DISCHARGE NOTE PROVIDER - CARE PROVIDER_API CALL
Behzad Ybarra AND YURI LARRY [facility] OF [address], [facility] [address]  [place] [address]  Phone: [phone]  Fax: [phone]  Follow Up Time: 1 week

## 2022-06-12 NOTE — PROGRESS NOTE ADULT - ASSESSMENT
Patient is a 52 year old female with no significant PMH presenting with worsening dysuria, urinary frequency, and hematuria who was admitted for acute UTI.     Acute UTI/cystitis   Leukocytosis due to above - resolved   - worsening urinary symptoms with hematuria, some chills, no CVA tenderness, mild suprapubic tenderness    - urinalysis with significant pyuria, large LE, negative nitrites and bacteria   - CTAP with cystitis with no e/o pyelonephritis    -- symptoms resolved now   - urine culture with E.coli - sensitivities pending    - blood cultures NGTD x2   - continue on ceftriaxone 1g IV Q24h   -- ok to discharge on cefpodoxime 100mg PO BID to complete 5d course       D/w Dr. Wayne Abdullahi M.D.  [facility], Division of Infectious Diseases  [phone]  After 5pm on weekdays and all day on weekends - please call [phone]

## 2022-06-12 NOTE — PATIENT PROFILE ADULT - FALL HARM RISK - UNIVERSAL INTERVENTIONS
Bed in lowest position, wheels locked, appropriate side rails in place/Call bell, personal items and telephone in reach/Instruct patient to call for assistance before getting out of bed or chair/Non-slip footwear when patient is out of bed/[place] to call system/Physically safe environment - no spills, clutter or unnecessary equipment/Purposeful Proactive Rounding/Room/bathroom lighting operational, light cord in reach

## 2022-06-12 NOTE — PROGRESS NOTE ADULT - SUBJECTIVE AND OBJECTIVE BOX
[facility], Division of Infectious Diseases  DAMIEN Islas Y. Patel, S. Shah, G. Casimir  [phone]  ([phone] - weekdays after 5pm and weekends)    Name: JULIA LOBATO  Age/Gender: 52y Female  MRN: [id number]    Interval History:  Patient seen and examined this morning.   Feeling better, urinary sx resolved.   No new complaints noted.  Notes reviewed  No concerning overnight events  Afebrile   Allergies: No Known Allergies      Objective:  Vitals:   T(F): 98.2 (22 @ 11:57), Max: 98.4 (22 @ 04:13)  HR: 63 (22 @ 11:57) (59 - 63)  BP: 97/66 (22 @ 11:57) (93/63 - 104/69)  RR: 18 (22 @ 11:57) (18 - 18)  SpO2: 96% (22 @ 11:57) (96% - 98%)  Physical Examination:  General: no acute distress  HEENT: NC/AT, anicteric, neck supple  Respiratory: no acc muscle use, breathing comfortably  Cardiovascular: S1 and S2 present  Gastrointestinal: normal appearing, nondistended  Extremities: no edema, no cyanosis  Skin: no visible rash    Laboratory Studies:  CBC:                       10.5   5.88  )-----------( 191      ( 2022 07:26 )             34.4     WBC Trend:  5.88 22 @ 07:26  11.87 22 @ 01:14    CMP:     139  |  106  |  9   ----------------------------<  103<H>  3.5   |  23  |  0.56    Ca    8.6      2022 07:29    TPro  7.1  /  Alb  4.5  /  TBili  0.8  /  DBili  x   /  AST  19  /  ALT  22  /  AlkPhos  76      Creatinine, Serum: 0.56 mg/dL (22 @ 07:29)  Creatinine, Serum: 0.56 mg/dL (22 @ 01:14)      LIVER FUNCTIONS - ( 2022 01:14 )  Alb: 4.5 g/dL / Pro: 7.1 g/dL / ALK PHOS: 76 U/L / ALT: 22 U/L / AST: 19 U/L / GGT: x           Urinalysis Basic - ( 2022 01:17 )  Color: Light Orange / Appearance: Turbid / S.016 / pH: x  Gluc: x / Ketone: Trace  / Bili: Negative / Urobili: Negative   Blood: x / Protein: 100 mg/dL / Nitrite: Negative   Leuk Esterase: Large / RBC: 2261 /hpf / WBC 2064 /HPF   Sq Epi: x / Non Sq Epi: 2 /hpf / Bacteria: Negative    Microbiology: reviewed   Culture - Blood (collected 22 @ 03:50)  Source: .Blood Blood-Peripheral  Preliminary Report (22 @ 11:01):    No growth to date.    Culture - Blood (collected 22 @ 03:43)  Source: .Blood Blood-Peripheral  Preliminary Report (22 @ 11:01):    No growth to date.    Culture - Urine (collected 22 @ 01:17)  Source: Clean Catch Clean Catch (Midstream)  Preliminary Report (22 @ 06:53):    50,000 - 99,000 CFU/mL Escherichia coli    Radiology: reviewed     Medications:  acetaminophen     Tablet .. 650 milliGRAM(s) Oral every 6 hours PRN  aluminum hydroxide/magnesium hydroxide/simethicone Suspension 30 milliLiter(s) Oral every 4 hours PRN  cefTRIAXone   IVPB 1000 milliGRAM(s) IV Intermittent every 24 hours  melatonin 3 milliGRAM(s) Oral at bedtime PRN  ondansetron Injectable 4 milliGRAM(s) IV Push every 8 hours PRN  sodium chloride 0.9%. 1000 milliLiter(s) IV Continuous <Continuous>    Antimicrobials:  cefTRIAXone   IVPB 1000 milliGRAM(s) IV Intermittent every 24 hours

## 2022-06-12 NOTE — DISCHARGE NOTE NURSING/CASE MANAGEMENT/SOCIAL WORK - PATIENT PORTAL LINK FT
You can access the FollowMyHealth Patient Portal offered by [facility] by registering at the following website: [email or web address]. By joining [employer]’s FollowMyHealth portal, you will also be able to view your health information using other applications (apps) compatible with our system.

## 2022-06-12 NOTE — DISCHARGE NOTE PROVIDER - NSDCCPCAREPLAN_GEN_ALL_CORE_FT
PRINCIPAL DISCHARGE DIAGNOSIS  Diagnosis: Acute cystitis  Assessment and Plan of Treatment: Complete antibiotics as prescribed.  Follow up with your PMD within one week of discharge to discuss your admission to the hosptial.  HOME CARE INSTRUCTIONS  f you were prescribed antibiotics, take them exactly as your caregiver instructs you. Finish the medication even if you feel better after you have only taken some of the medication.  Drink enough water and fluids to keep your urine clear or pale yellow.  Avoid caffeine, tea, and carbonated beverages. They tend to irritate your bladder.  Empty your bladder often. Avoid holding urine for long periods of time.  Empty your bladder before and after sexual intercourse.  After a bowel movement, women should cleanse from front to back. Use each tissue only once.  SEEK MEDICAL CARE IF:  You have back pain.  You develop a fever.  Your symptoms do not begin to resolve within 3 days.  SEEK IMMEDIATE MEDICAL CARE IF:  You have severe back pain or lower abdominal pain.  You develop chills.  You have nausea or vomiting.  You have continued burning or discomfort with urination.      SECONDARY DISCHARGE DIAGNOSES  Diagnosis: H/O cardiac murmur  Assessment and Plan of Treatment: Stable.  Follow up as outpatient with your PMD.

## 2022-06-12 NOTE — DISCHARGE NOTE PROVIDER - HOSPITAL COURSE
52 year old F with no significant PMH presenting with urinary frequency, pain and hematuria x1 day.  Also developed shaking with chills. Denied fevers, N/V/D, CP, SOB, back/flank pain.  CT abd consistent with cystitis.  Started on ceftriaxone.  UCx with Ecoli.  Bcx negative.  Patient with noted improvement.  Medically cleared for d/c home on Cefpodoxime 100mg PO BID to complete 5 days abx (including what was administered in hospital).  Follow up with PCP as outpatient.

## 2022-06-12 NOTE — DISCHARGE NOTE NURSING/CASE MANAGEMENT/SOCIAL WORK - NSDCPEFALRISK_GEN_ALL_CORE
For information on Fall & Injury Prevention, visit: [email or web address] OR  [email or web address] OR  https://www.cdc.gov/steadi/patient.html

## 2022-06-16 LAB
CULTURE RESULTS: SIGNIFICANT CHANGE UP
CULTURE RESULTS: SIGNIFICANT CHANGE UP
SPECIMEN SOURCE: SIGNIFICANT CHANGE UP
SPECIMEN SOURCE: SIGNIFICANT CHANGE UP

## 2022-09-13 ENCOUNTER — FORM ENCOUNTER (OUTPATIENT)
Age: 52
End: 2022-09-13

## 2022-09-27 ENCOUNTER — FORM ENCOUNTER (OUTPATIENT)
Age: 52
End: 2022-09-27

## 2022-10-04 ENCOUNTER — APPOINTMENT (OUTPATIENT)
Dept: HEPATOLOGY | Facility: CLINIC | Age: 52
End: 2022-10-04

## 2022-10-06 ENCOUNTER — NON-APPOINTMENT (OUTPATIENT)
Age: 52
End: 2022-10-06

## 2022-10-06 ENCOUNTER — APPOINTMENT (OUTPATIENT)
Dept: ORTHOPEDIC SURGERY | Facility: CLINIC | Age: 52
End: 2022-10-06

## 2022-10-06 ENCOUNTER — LABORATORY RESULT (OUTPATIENT)
Age: 52
End: 2022-10-06

## 2022-10-06 VITALS
HEIGHT: 65 IN | DIASTOLIC BLOOD PRESSURE: 75 MMHG | BODY MASS INDEX: 19.83 KG/M2 | WEIGHT: 119 LBS | SYSTOLIC BLOOD PRESSURE: 113 MMHG | HEART RATE: 77 BPM | OXYGEN SATURATION: 97 %

## 2022-10-06 PROCEDURE — 99204 OFFICE O/P NEW MOD 45 MIN: CPT | Mod: 25

## 2022-10-06 PROCEDURE — 20206Z: CUSTOM

## 2022-10-06 NOTE — PROCEDURE
[Biopsy] : Biopsy [Right] : right  [Mass ___ (size, type of)] :  [unfilled] Mass [Patient] : patient [Risk] : Risk [Benefits] : benefits [Alternatives] : alternatives [Bleeding] : bleeding [Verbal Consent Obtained] : verbal consent was obtained prior to the procedure [Alcohol] : Alcohol [Betadine] : Betadine [Ethyl Chloride Spray] : ethyl chloride spray was used as a topical anesthetic [___mL] : [unfilled] ~L [1%] : 1% lidocaine [Ultrasound Guided] : The procedure was ultrasound guided.   [Direct] : direct [Trucut] : Trucut needle [Bandage Applied] : a bandage [Pressure Held] : and pressure was held on site. [Cores] : cores [FNA] : FNA [Tolerated Well] : the patient tolerated the procedure well [None] : None [de-identified] : Thigh

## 2022-10-06 NOTE — DATA REVIEWED
[Imaging Present] : Present [de-identified] : CT scan from September 14 and MRI from September 20, 2022 shows a 5 x 4 x 3 cm mass in the vastus intermedius.\par \par Focused ultrasound today was used in order to get a needle into into the mass.  It showed a 5 cm mass in the vastus medialis anterior to the vessels and away from the bone.  It had no obvious flow in it.

## 2022-10-06 NOTE — HISTORY OF PRESENT ILLNESS
[FreeTextEntry1] : This is a 52-year-old female who has a mass in her right thigh.  It has been there for approximately 3 months that she knows about.  She thinks it is may be gotten somewhat bigger.  She had some imaging and was sent to me.  She also has bilateral leg and hand pain from time to time does not seem to be related or associated with this. [Stable] : stable [1] : currently ~his/her~ pain is 1 out of 10

## 2022-10-06 NOTE — DISCUSSION/SUMMARY
[Surgical risks reviewed] : Surgical risks reviewed [All Questions Answered] : Patient (and family) had all questions answered to an agreeable level of satisfaction [Interested in Proceeding] : Patient (and family) expressed understanding and interest in proceeding with the plan as outlined [de-identified] : Patient has a soft tissue mass.  This could be consistent with myxoma or other mass.  Regardless I think she needs biopsy.  We will do this under ultrasound today.  Once we have pathology will be able to discuss different options for resection versus other treatment.  She understands there is a chance of malignancy and this will similarly be dealt with as necessary.\par \par If imaging was ordered, the patient was told to make an appointment to review findings right after all imaging is completed.\par \par We discussed risks, benefits and alternatives. Rationale of care was reviewed and all questions were answered. Patient (and family) had all questions answered to her degree of the level of satisfaction. Patient (and family) expressed understanding and interest in proceeding with the plan as outlined.\par \par \par \par \par This note was done with a voice recognition transcription software and any typos are related to this rather than medical error. Surgical risks reviewed. Patient (and family) had all questions answered to an agreeable level of satisfaction. Patient (and family) expressed understanding and interest in proceeding with the plan as outlined.  \par

## 2022-10-06 NOTE — PHYSICAL EXAM
[FreeTextEntry1] : On exam the patient does complain of occasional anterior knee bilateral pain as well as generalized hand and foot pain.  She does have a mass in the medial thigh.  This is palpable deep approximately 5 cm.  There is no Tinel's, thrills or bruits.  She has no inguinal lymphadenopathy.  She is neurovascularly intact. [General Appearance - Well-Appearing] : Well appearing [General Appearance - Well Nourished] : well nourished [Oriented To Time, Place, And Person] : Oriented to person, place, and time [Impaired Insight] : Insight and judgment were intact [Affect] : The affect was normal. [Mood] : the mood was normal [Sclera] : the sclera and conjunctiva were normal [Neck Cervical Mass (___cm)] : no neck mass was observed [Heart Rate And Rhythm] : heart rate was normal and rhythm regular [] : No respiratory distress [Abdomen Soft] : Soft [Normal Station and Gait] : gait and station were normal

## 2022-10-11 ENCOUNTER — APPOINTMENT (OUTPATIENT)
Dept: ORTHOPEDIC SURGERY | Facility: CLINIC | Age: 52
End: 2022-10-11

## 2022-10-20 ENCOUNTER — APPOINTMENT (OUTPATIENT)
Dept: ORTHOPEDIC SURGERY | Facility: CLINIC | Age: 52
End: 2022-10-20

## 2022-10-20 VITALS
BODY MASS INDEX: 19.83 KG/M2 | WEIGHT: 119 LBS | HEART RATE: 75 BPM | DIASTOLIC BLOOD PRESSURE: 70 MMHG | SYSTOLIC BLOOD PRESSURE: 112 MMHG | OXYGEN SATURATION: 97 % | HEIGHT: 65 IN

## 2022-10-20 PROCEDURE — 99214 OFFICE O/P EST MOD 30 MIN: CPT

## 2022-10-20 NOTE — DATA REVIEWED
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

## 2022-10-20 NOTE — DISCUSSION/SUMMARY
[Surgical risks reviewed] : Surgical risks reviewed [All Questions Answered] : Patient (and family) had all questions answered to an agreeable level of satisfaction [Interested in Proceeding] : Patient (and family) expressed understanding and interest in proceeding with the plan as outlined [de-identified] : We discussed that myxoma is benign however on full resection she may have a different diagnosis after we examined the entire mass.  However at this point I think we can do an appropriate excision.  She understands this is near the neurovascular structures and are at risk.  She also understands the saphenous nerve is at risk with possible paresthesias afterwards possibly temporary versus permanent.  We will schedule her for surgery in the near future.  Other treatment such as repeat surgery versus radiation or other modalities are also possibility.\par \par If imaging was ordered, the patient was told to make an appointment to review findings right after all imaging is completed.\par \par We discussed risks, benefits and alternatives. Rationale of care was reviewed and all questions were answered. Patient (and family) had all questions answered to her degree of the level of satisfaction. Patient (and family) expressed understanding and interest in proceeding with the plan as outlined.\par \par \par \par \par This note was done with a voice recognition transcription software and any typos are related to this rather than medical error. Surgical risks reviewed. Patient (and family) had all questions answered to an agreeable level of satisfaction. Patient (and family) expressed understanding and interest in proceeding with the plan as outlined.  \par

## 2022-10-20 NOTE — HISTORY OF PRESENT ILLNESS
[FreeTextEntry1] : Patient is stable at this point.  She feels that the mass is somewhat bigger but she has no pain after biopsy. [Stable] : stable [1] : currently ~his/her~ pain is 1 out of 10

## 2022-10-20 NOTE — PHYSICAL EXAM
Remains on  Dilaudid drip of 0.2 mg/hr with breakthrough of Dilaudid 0.5mg IV Q1h prn dyspnea or pain. Pt required 2 breakthrough doses in the past 24 hours. Pt had increased need for Dilaudid, discussed with , may  benefit from continuous coverage. [FreeTextEntry1] : On exam the patient does complain of occasional anterior knee bilateral pain as well as generalized hand and foot pain.  She does have a mass in the medial thigh.  This is palpable deep approximately 5 cm.  There is no Tinel's, thrills or bruits.  She has no inguinal lymphadenopathy.  She is neurovascularly intact.  Masses not changed since before.  Her biopsy site is intact. [General Appearance - Well-Appearing] : Well appearing [General Appearance - Well Nourished] : well nourished [Oriented To Time, Place, And Person] : Oriented to person, place, and time [Impaired Insight] : Insight and judgment were intact [Affect] : The affect was normal. [Mood] : the mood was normal [Sclera] : the sclera and conjunctiva were normal [Neck Cervical Mass (___cm)] : no neck mass was observed [Heart Rate And Rhythm] : heart rate was normal and rhythm regular [] : No respiratory distress [Abdomen Soft] : Soft [Normal Station and Gait] : gait and station were normal [Tenderness] : no tenderness [Swelling] : swelling [Masses] : masses [Skin Changes - Describe changes:] : No skin changes noted [Normal] : Palpable DP and PT pulses, warm and pink with brisk capillary refill [Full ROM Unless otherwise noted:] : Full range of motion unless otherwise noted: [LE  Motor Strength Normal unless otherwise noted:] : 5/5 strength in bilateral lower extemities unless otherwise noted.

## 2022-10-26 ENCOUNTER — OUTPATIENT (OUTPATIENT)
Dept: OUTPATIENT SERVICES | Facility: HOSPITAL | Age: 52
LOS: 1 days | End: 2022-10-26

## 2022-10-26 VITALS
HEART RATE: 81 BPM | HEIGHT: 61 IN | WEIGHT: 121.92 LBS | DIASTOLIC BLOOD PRESSURE: 74 MMHG | TEMPERATURE: 98 F | OXYGEN SATURATION: 97 % | RESPIRATION RATE: 16 BRPM | SYSTOLIC BLOOD PRESSURE: 106 MMHG

## 2022-10-26 DIAGNOSIS — Z98.890 OTHER SPECIFIED POSTPROCEDURAL STATES: Chronic | ICD-10-CM

## 2022-10-26 DIAGNOSIS — R22.1 LOCALIZED SWELLING, MASS AND LUMP, NECK: ICD-10-CM

## 2022-10-26 DIAGNOSIS — R22.41 LOCALIZED SWELLING, MASS AND LUMP, RIGHT LOWER LIMB: ICD-10-CM

## 2022-10-26 LAB
ALBUMIN SERPL ELPH-MCNC: 4.3 G/DL — SIGNIFICANT CHANGE UP (ref 3.3–5)
ALP SERPL-CCNC: 93 U/L — SIGNIFICANT CHANGE UP (ref 40–120)
ALT FLD-CCNC: 24 U/L — SIGNIFICANT CHANGE UP (ref 4–33)
ANION GAP SERPL CALC-SCNC: 13 MMOL/L — SIGNIFICANT CHANGE UP (ref 7–14)
AST SERPL-CCNC: 22 U/L — SIGNIFICANT CHANGE UP (ref 4–32)
BILIRUB SERPL-MCNC: 0.6 MG/DL — SIGNIFICANT CHANGE UP (ref 0.2–1.2)
BUN SERPL-MCNC: 10 MG/DL — SIGNIFICANT CHANGE UP (ref 7–23)
CALCIUM SERPL-MCNC: 9.4 MG/DL — SIGNIFICANT CHANGE UP (ref 8.4–10.5)
CHLORIDE SERPL-SCNC: 103 MMOL/L — SIGNIFICANT CHANGE UP (ref 98–107)
CO2 SERPL-SCNC: 24 MMOL/L — SIGNIFICANT CHANGE UP (ref 22–31)
CREAT SERPL-MCNC: 0.55 MG/DL — SIGNIFICANT CHANGE UP (ref 0.5–1.3)
EGFR: 110 ML/MIN/1.73M2 — SIGNIFICANT CHANGE UP
GLUCOSE SERPL-MCNC: 200 MG/DL — HIGH (ref 70–99)
HCT VFR BLD CALC: 39.1 % — SIGNIFICANT CHANGE UP (ref 34.5–45)
HGB BLD-MCNC: 12 G/DL — SIGNIFICANT CHANGE UP (ref 11.5–15.5)
MCHC RBC-ENTMCNC: 20.7 PG — LOW (ref 27–34)
MCHC RBC-ENTMCNC: 30.7 GM/DL — LOW (ref 32–36)
MCV RBC AUTO: 67.3 FL — LOW (ref 80–100)
NRBC # BLD: 0 /100 WBCS — SIGNIFICANT CHANGE UP (ref 0–0)
NRBC # FLD: 0 K/UL — SIGNIFICANT CHANGE UP (ref 0–0)
PLATELET # BLD AUTO: 223 K/UL — SIGNIFICANT CHANGE UP (ref 150–400)
POTASSIUM SERPL-MCNC: 3.7 MMOL/L — SIGNIFICANT CHANGE UP (ref 3.5–5.3)
POTASSIUM SERPL-SCNC: 3.7 MMOL/L — SIGNIFICANT CHANGE UP (ref 3.5–5.3)
PROT SERPL-MCNC: 7 G/DL — SIGNIFICANT CHANGE UP (ref 6–8.3)
RBC # BLD: 5.81 M/UL — HIGH (ref 3.8–5.2)
RBC # FLD: 15.9 % — HIGH (ref 10.3–14.5)
SODIUM SERPL-SCNC: 140 MMOL/L — SIGNIFICANT CHANGE UP (ref 135–145)
WBC # BLD: 5.73 K/UL — SIGNIFICANT CHANGE UP (ref 3.8–10.5)
WBC # FLD AUTO: 5.73 K/UL — SIGNIFICANT CHANGE UP (ref 3.8–10.5)

## 2022-10-26 PROCEDURE — 93010 ELECTROCARDIOGRAM REPORT: CPT

## 2022-10-26 RX ORDER — SODIUM CHLORIDE 9 MG/ML
1000 INJECTION, SOLUTION INTRAVENOUS
Refills: 0 | Status: DISCONTINUED | OUTPATIENT
Start: 2022-11-02 | End: 2022-11-16

## 2022-10-26 NOTE — H&P PST ADULT - SKIN/BREAST COMMENTS
Right thigh mass Right thigh mass - biopsy site slight redness - benign results - pt c/o of increase in size and slight discomfort  with walking

## 2022-10-26 NOTE — H&P PST ADULT - NSICDXPASTMEDICALHX_GEN_ALL_CORE_FT
PAST MEDICAL HISTORY:  Mass of right thigh     Otitis media right ear -     PAST MEDICAL HISTORY:  H/O cardiac murmur     History of hepatitis B     Mass of right thigh     Otitis media right ear -

## 2022-10-26 NOTE — H&P PST ADULT - PROBLEM SELECTOR PLAN 1
Pt scheduled for surgery and preop instructions including instructions for taking Famotidine and for Chlorhexidine use in showering on the day of surgery, given verbally and with use of  written materials, and patient confirming understanding of such instructions using  teach back method.  Pt to see PCP for evaluation of past hx and murmur and abnormal EKG - Request Echo and EKG   Pt also has hx HepB with fatty liver - pt seen by new GI this past year - old note from 10/21 notes need for med and f/u - will request new MD note/clearance  Email sent to surgeon

## 2022-10-26 NOTE — H&P PST ADULT - CARDIOVASCULAR COMMENTS
Audible murmur - pt denies past Echo or recent Cardio w/u - denies dizziness or weakness with activity

## 2022-10-26 NOTE — H&P PST ADULT - CARDIOVASCULAR
regular rate and rhythm/S1 S2 present negative regular rate and rhythm/S1 S2 present/murmur details…

## 2022-10-26 NOTE — H&P PST ADULT - HISTORY OF PRESENT ILLNESS
Pt is a 52 yr old female scheduled for Resection Right Thigh Mass with Dr Siddiqui tentatively 11/2/22 - pt noted thigh swelling 7/22 and thinks it has increased slightly in size. Pt had biopsy - negative - desires to have removed.   Pt given information for COVID PCR testing sites and told to make appointment 3-5 days preop  Pt is a 52 yr old female scheduled for Resection Right Thigh Mass with Dr Siddiqui tentatively 11/2/22 - pt noted thigh swelling 7/22 and thinks it has increased slightly in size. Pt had biopsy - negative - desires to have removed. Pt hx Hep B with HBV DNA that is elevated , significant murmur but unknown Cardio w/u   Pt given information for COVID PCR testing sites and told to make appointment 3-5 days preop

## 2022-10-26 NOTE — H&P PST ADULT - NEUROLOGICAL COMMENTS
Pt thinks she had seizure when she was given med for UTI while in ER [facility] 8/22 Note from 6/12/22 [facility] admission for UTI - pt claims poss seizure from abx given but note for summary discharge does not mention - pt to see PCP for MC

## 2022-10-26 NOTE — H&P PST ADULT - ATTENDING COMMENTS
I have reviewed and agree with note as written above  for resection right thigh mass  Risks, benefits and alternatives discussed with patient.  Fili Siddiqui MD  Musculoskeletal Oncology  [phone]

## 2022-10-29 LAB — SARS-COV-2 RNA SPEC QL NAA+PROBE: SIGNIFICANT CHANGE UP

## 2022-11-01 ENCOUNTER — TRANSCRIPTION ENCOUNTER (OUTPATIENT)
Age: 52
End: 2022-11-01

## 2022-11-01 NOTE — ASU PATIENT PROFILE, ADULT - VISION (WITH CORRECTIVE LENSES IF THE PATIENT USUALLY WEARS THEM):
Wear glasses always/Normal vision: sees adequately in most situations; can see medication labels, newsprint

## 2022-11-01 NOTE — ASU PATIENT PROFILE, ADULT - FALL HARM RISK - UNIVERSAL INTERVENTIONS
Bed in lowest position, wheels locked, appropriate side rails in place/Call bell, personal items and telephone in reach/Instruct patient to call for assistance before getting out of bed or chair/Non-slip footwear when patient is out of bed/[place] to call system/Physically safe environment - no spills, clutter or unnecessary equipment/Purposeful Proactive Rounding/Room/bathroom lighting operational, light cord in reach

## 2022-11-01 NOTE — ASU PATIENT PROFILE, ADULT - NSICDXPASTMEDICALHX_GEN_ALL_CORE_FT
PAST MEDICAL HISTORY:  H/O cardiac murmur     History of hepatitis B     Mass of right thigh     Otitis media right ear -

## 2022-11-01 NOTE — ASU PATIENT PROFILE, ADULT - PATIENT'S GENDER IDENTITY
You can access the [facility] Patient Portal, offered by [facility], by registering with the following website: [email or web address]

## 2022-11-02 ENCOUNTER — APPOINTMENT (OUTPATIENT)
Dept: ORTHOPEDIC SURGERY | Facility: HOSPITAL | Age: 52
End: 2022-11-02

## 2022-11-02 ENCOUNTER — TRANSCRIPTION ENCOUNTER (OUTPATIENT)
Age: 52
End: 2022-11-02

## 2022-11-02 ENCOUNTER — OUTPATIENT (OUTPATIENT)
Dept: OUTPATIENT SERVICES | Facility: HOSPITAL | Age: 52
LOS: 1 days | Discharge: HOME | End: 2022-11-02

## 2022-11-02 ENCOUNTER — RESULT REVIEW (OUTPATIENT)
Age: 52
End: 2022-11-02

## 2022-11-02 VITALS
OXYGEN SATURATION: 99 % | DIASTOLIC BLOOD PRESSURE: 65 MMHG | WEIGHT: 121.92 LBS | HEART RATE: 63 BPM | HEIGHT: 61 IN | RESPIRATION RATE: 15 BRPM | SYSTOLIC BLOOD PRESSURE: 111 MMHG | TEMPERATURE: 98 F

## 2022-11-02 VITALS
DIASTOLIC BLOOD PRESSURE: 61 MMHG | OXYGEN SATURATION: 99 % | HEART RATE: 69 BPM | RESPIRATION RATE: 16 BRPM | SYSTOLIC BLOOD PRESSURE: 101 MMHG

## 2022-11-02 DIAGNOSIS — Z98.890 OTHER SPECIFIED POSTPROCEDURAL STATES: Chronic | ICD-10-CM

## 2022-11-02 DIAGNOSIS — R22.1 LOCALIZED SWELLING, MASS AND LUMP, NECK: ICD-10-CM

## 2022-11-02 PROCEDURE — 27364 RESECT THIGH/KNEE TUM 5 CM/>: CPT | Mod: RT

## 2022-11-02 PROCEDURE — 88307 TISSUE EXAM BY PATHOLOGIST: CPT | Mod: 26

## 2022-11-02 RX ORDER — OXYCODONE HYDROCHLORIDE 5 MG/1
5 TABLET ORAL ONCE
Refills: 0 | Status: DISCONTINUED | OUTPATIENT
Start: 2022-11-02 | End: 2022-11-02

## 2022-11-02 RX ORDER — OXYCODONE HYDROCHLORIDE 5 MG/1
10 TABLET ORAL ONCE
Refills: 0 | Status: DISCONTINUED | OUTPATIENT
Start: 2022-11-02 | End: 2022-11-02

## 2022-11-02 RX ORDER — OXYCODONE HYDROCHLORIDE 5 MG/1
5 TABLET ORAL EVERY 4 HOURS
Refills: 0 | Status: DISCONTINUED | OUTPATIENT
Start: 2022-11-02 | End: 2022-11-02

## 2022-11-02 RX ORDER — OXYCODONE HYDROCHLORIDE 5 MG/1
10 TABLET ORAL EVERY 4 HOURS
Refills: 0 | Status: DISCONTINUED | OUTPATIENT
Start: 2022-11-02 | End: 2022-11-02

## 2022-11-02 RX ORDER — ACETAMINOPHEN 500 MG
650 TABLET ORAL EVERY 6 HOURS
Refills: 0 | Status: DISCONTINUED | OUTPATIENT
Start: 2022-11-02 | End: 2022-11-16

## 2022-11-02 RX ORDER — FENTANYL CITRATE 50 UG/ML
25 INJECTION INTRAVENOUS
Refills: 0 | Status: DISCONTINUED | OUTPATIENT
Start: 2022-11-02 | End: 2022-11-02

## 2022-11-02 RX ORDER — FENTANYL CITRATE 50 UG/ML
50 INJECTION INTRAVENOUS
Refills: 0 | Status: DISCONTINUED | OUTPATIENT
Start: 2022-11-02 | End: 2022-11-02

## 2022-11-02 RX ORDER — OXYCODONE HYDROCHLORIDE 5 MG/1
1 TABLET ORAL
Qty: 10 | Refills: 0
Start: 2022-11-02 | End: 2022-11-03

## 2022-11-02 RX ORDER — ACETAMINOPHEN 500 MG
1 TABLET ORAL
Qty: 24 | Refills: 0
Start: 2022-11-02 | End: 2022-11-05

## 2022-11-02 RX ORDER — ACETAMINOPHEN 500 MG
975 TABLET ORAL ONCE
Refills: 0 | Status: COMPLETED | OUTPATIENT
Start: 2022-11-02 | End: 2022-11-02

## 2022-11-02 RX ORDER — ONDANSETRON 8 MG/1
4 TABLET, FILM COATED ORAL ONCE
Refills: 0 | Status: DISCONTINUED | OUTPATIENT
Start: 2022-11-02 | End: 2022-11-16

## 2022-11-02 RX ADMIN — Medication 975 MILLIGRAM(S): at 08:55

## 2022-11-02 NOTE — ASU DISCHARGE PLAN (ADULT/PEDIATRIC) - FOLLOW UP APPOINTMENTS
[facility], Ambulatory Surgical Unit may also call Recovery Room (PACU) 24/7 @ [phone]

## 2022-11-02 NOTE — ASU DISCHARGE PLAN (ADULT/PEDIATRIC) - NURSING INSTRUCTIONS
Watch for signs of infection; redness, swelling, fever, chills or heat, report such symptoms to the MD. No driving while taking pain medication, it causes drowsiness & constipation. Drink 6-8 glasses of fluids daily to promote hydration. No heavy lifting, pulling or pushing heavy objects. Follow surgeons instructions with dressing care.  Follow up with surgical MD. Watch for signs of infection; redness, swelling, fever, chills or heat, report such symptoms to the MD. No driving while taking pain medication, it causes drowsiness & constipation. Drink 6-8 glasses of fluids daily to promote hydration. No heavy lifting, pulling or pushing heavy objects. Follow surgeons instructions with dressing care.  Follow up with surgical MD. Tylenol given at 0900 in the OR, do not take any Tylenol till 3 pm

## 2022-11-02 NOTE — BRIEF OPERATIVE NOTE - NSICDXBRIEFPROCEDURE_GEN_ALL_CORE_FT
PROCEDURES:  Excision, soft tissue tumor, subfascial, 1.5 cm or greater 02-Nov-2022 10:40:10 right medial thigh Miller Orantes

## 2022-11-02 NOTE — ASU DISCHARGE PLAN (ADULT/PEDIATRIC) - ASU DC SPECIAL INSTRUCTIONSFT
Orthopedic Surgery DC Instructions:    1. PAIN CONTROL: Please take pain medications as needed. A prescription for pain medication was sent to your pharmacy. If there are issues with pain control, please call Dr. Siddiqui's office.   2. ACTIVITY: Weightbearing as tolerated with assistive devices as needed (i.e. crutches/cane/walker).   3. SUTURES/STAPLES: Your sutures are absorbable. They do not have to be removed.  4. BANDAGE: You may remove the ACE wrap in 2 days. Please keep the surgical bandage on until you see Dr. Siddiqui. This will be changed at your postoperative visit.  5. BATHING: Showering is allowed, however sponge baths are recommended. You must keep your bandage/splint/cast clean, dry, and intact. Please cover with a plastic bag/wrap to prevent dressing from becoming wet. Do NOT submerge the surgical site in water. Avoid baths/pools/hot tubs until cleared by your surgeon.   6. FOLLOW UP: Follow up with Dr. Siddiqui in the office in 2 weeks. Please call the office for appointment if you do not already have one.

## 2022-11-02 NOTE — ASU DISCHARGE PLAN (ADULT/PEDIATRIC) - CARE PROVIDER_API CALL
Fili Siddiqui (MD)  Orthopaedic Surgery  [address], [address]  [place] [address]  Phone: [phone]  Fax: [phone]  Follow Up Time:

## 2022-11-17 ENCOUNTER — APPOINTMENT (OUTPATIENT)
Dept: ORTHOPEDIC SURGERY | Facility: CLINIC | Age: 52
End: 2022-11-17

## 2022-11-17 VITALS
DIASTOLIC BLOOD PRESSURE: 73 MMHG | HEART RATE: 87 BPM | OXYGEN SATURATION: 95 % | HEIGHT: 65 IN | WEIGHT: 120 LBS | SYSTOLIC BLOOD PRESSURE: 118 MMHG | BODY MASS INDEX: 19.99 KG/M2

## 2022-11-17 PROBLEM — Z86.19 PERSONAL HISTORY OF OTHER INFECTIOUS AND PARASITIC DISEASES: Chronic | Status: ACTIVE | Noted: 2022-10-26

## 2022-11-17 PROBLEM — R22.41 LOCALIZED SWELLING, MASS AND LUMP, RIGHT LOWER LIMB: Chronic | Status: ACTIVE | Noted: 2022-10-26

## 2022-11-17 PROBLEM — Z86.79 PERSONAL HISTORY OF OTHER DISEASES OF THE CIRCULATORY SYSTEM: Chronic | Status: ACTIVE | Noted: 2022-10-26

## 2022-11-17 PROCEDURE — 99024 POSTOP FOLLOW-UP VISIT: CPT

## 2022-11-17 NOTE — HISTORY OF PRESENT ILLNESS
[___ Weeks Post Op] : [unfilled] weeks post op [Clean/Dry/Intact] : clean, dry and intact [Swelling] : not swollen [Vascular Intact] : ~T peripheral vascular exam normal [Negative Favio's] : maneuvers demonstrated a negative Favio's sign [Xray (Date:___)] : [unfilled] Xray -  [Doing Well] : is doing well [Excellent Pain Control] : has excellent pain control [No Sign of Infection] : is showing no signs of infection [de-identified] : 11/2/2022 - resection right medial thigh mass [de-identified] : Patient is doing well 2 weeks postop.  She has almost no pain except for some pain at the medial side of her distal thigh distal to the area of the surgery.  She is walking. [de-identified] : On exam the patient stands in good balance.  Her incision is clean dry and intact.  She has some medial sided tenderness distal to the wound.  She is neurovascular intact distally and her calves are soft.  She has minimal paresthesias in this area towards the top of the patella she has no paresthesias below the knee. 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  I have told her that this is benign and is unlikely to recur.  She is free to go back to regular activity as her symptoms allow.  I will see her back again in 2 to 3 months. [de-identified] : \par \par If imaging was ordered, the patient was told to make an appointment to review findings right after all imaging is completed.\par \par We discussed risks, benefits and alternatives. Rationale of care was reviewed and all questions were answered. Patient (and family) had all questions answered to her degree of the level of satisfaction. Patient (and family) expressed understanding and interest in proceeding with the plan as outlined.\par \par \par \par \par This note was done with a voice recognition transcription software and any typos are related to this rather than medical error. Surgical risks reviewed. Patient (and family) had all questions answered to an agreeable level of satisfaction. Patient (and family) expressed understanding and interest in proceeding with the plan as outlined.  \par

## 2023-01-05 ENCOUNTER — APPOINTMENT (OUTPATIENT)
Dept: ORTHOPEDIC SURGERY | Facility: CLINIC | Age: 53
End: 2023-01-05
Payer: COMMERCIAL

## 2023-01-05 DIAGNOSIS — D21.9 BENIGN NEOPLASM OF CONNECTIVE AND OTHER SOFT TISSUE, UNSPECIFIED: ICD-10-CM

## 2023-01-05 DIAGNOSIS — R22.41 LOCALIZED SWELLING, MASS AND LUMP, RIGHT LOWER LIMB: ICD-10-CM

## 2023-01-05 PROCEDURE — 99024 POSTOP FOLLOW-UP VISIT: CPT

## 2023-01-05 NOTE — HISTORY OF PRESENT ILLNESS
[___ Months Post Op] : [unfilled] months post op [Clean/Dry/Intact] : clean, dry and intact [Swelling] : not swollen [Vascular Intact] : ~T peripheral vascular exam normal [Negative Favio's] : maneuvers demonstrated a negative Favio's sign [Xray (Date:___)] : [unfilled] Xray -  [Doing Well] : is doing well [Excellent Pain Control] : has excellent pain control [No Sign of Infection] : is showing no signs of infection [de-identified] : 11/2/2022 - resection right medial thigh mass [de-identified] : Patient is doing well 2 months postop.  She has some pain at the medial thigh distally but has no more paresthesias. [de-identified] : On exam the patient stands in good balance.  Her incision is clean dry and intact.  She has some medial sided tenderness distal to the wound.  She is neurovascular intact distally and her calves are soft.   [de-identified] : Patient is stable at this point 2 months post op.  I recommended some physical therapy just to try and get her stronger but otherwise she can go back to regular activity.  I can see her back again as needed.  We discussed this is benign and unlikely to recur or have any other problems elsewhere in her body related to it.  If she feels any masses she can come back as necessary for ultrasound/MRI. [de-identified] : \par \par If imaging was ordered, the patient was told to make an appointment to review findings right after all imaging is completed.\par \par We discussed risks, benefits and alternatives. Rationale of care was reviewed and all questions were answered. Patient (and family) had all questions answered to her degree of the level of satisfaction. Patient (and family) expressed understanding and interest in proceeding with the plan as outlined.\par \par \par \par \par This note was done with a voice recognition transcription software and any typos are related to this rather than medical error. Surgical risks reviewed. Patient (and family) had all questions answered to an agreeable level of satisfaction. Patient (and family) expressed understanding and interest in proceeding with the plan as outlined.  \par

## 2023-03-27 ENCOUNTER — APPOINTMENT (OUTPATIENT)
Dept: ENDOCRINOLOGY | Facility: CLINIC | Age: 53
End: 2023-03-27
Payer: COMMERCIAL

## 2023-03-27 VITALS
OXYGEN SATURATION: 99 % | BODY MASS INDEX: 20.04 KG/M2 | SYSTOLIC BLOOD PRESSURE: 122 MMHG | HEART RATE: 73 BPM | WEIGHT: 120.31 LBS | HEIGHT: 65 IN | DIASTOLIC BLOOD PRESSURE: 82 MMHG | TEMPERATURE: 98.7 F

## 2023-03-27 DIAGNOSIS — Z87.19 PERSONAL HISTORY OF OTHER DISEASES OF THE DIGESTIVE SYSTEM: ICD-10-CM

## 2023-03-27 PROCEDURE — 99204 OFFICE O/P NEW MOD 45 MIN: CPT | Mod: 25

## 2023-03-28 LAB
T3FREE SERPL-MCNC: 2.63 PG/ML
T4 FREE SERPL-MCNC: 1.3 NG/DL
THYROGLOB AB SERPL-ACNC: <20 IU/ML
THYROPEROXIDASE AB SERPL IA-ACNC: <10 IU/ML
TSH SERPL-ACNC: 1.7 UIU/ML

## 2023-03-30 LAB — TSI ACT/NOR SER: <0.1 IU/L

## 2023-03-31 LAB — TSH RECEPTOR AB: <1.1 IU/L

## 2023-04-04 PROBLEM — Z87.19 HISTORY OF FATTY INFILTRATION OF LIVER: Status: RESOLVED | Noted: 2023-03-27 | Resolved: 2023-04-04

## 2023-04-04 NOTE — HISTORY OF PRESENT ILLNESS
[FreeTextEntry1] : Ms. LOBATO  is a 52 year old  female  who presents for initial endocrine evaluation. She presents with regard to a history of thyroid nodule. 6-9 months ago she reported hoarsens in her voice which lasted about 6-8 weeks but then did remit on its own.  A few months later she experienced severe headache throughout the day for about a week. She did decide to go to  the ER and MRI  was carried out. During her imaging, a thyroid nodule was detected.. Headaches ultimately went away on its own \par \par She then went for thyroid US and subsequently  did have FNA carried out on 01/24/2023 of a  Right mid thyroid nodule measuring 1.3cm. Results were c/w  [place] Category 1 with insufficient follicular cell clusters and deemed  suboptimal for complete cytopathologic evaluation. \par \par She denies any anterior neck pain, dysphagia, chest pain, palpitations, fatigue, changes in weight, changes in bowel habits. \par \par Family History:\par Mother nodular goiter\par Father: none \par \par  Additional medical history includes that of Hep B,  ? Murmur-hs followed with Cardiologist ( Dr. 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 Ybarra PCP \par

## 2023-04-05 ENCOUNTER — RESULT REVIEW (OUTPATIENT)
Age: 53
End: 2023-04-05

## 2023-04-19 ENCOUNTER — APPOINTMENT (OUTPATIENT)
Dept: ULTRASOUND IMAGING | Facility: IMAGING CENTER | Age: 53
End: 2023-04-19
Payer: COMMERCIAL

## 2023-04-19 ENCOUNTER — OUTPATIENT (OUTPATIENT)
Dept: OUTPATIENT SERVICES | Facility: HOSPITAL | Age: 53
LOS: 1 days | End: 2023-04-19
Payer: COMMERCIAL

## 2023-04-19 DIAGNOSIS — Z98.890 OTHER SPECIFIED POSTPROCEDURAL STATES: Chronic | ICD-10-CM

## 2023-04-19 DIAGNOSIS — E04.1 NONTOXIC SINGLE THYROID NODULE: ICD-10-CM

## 2023-04-19 PROCEDURE — 88173 CYTOPATH EVAL FNA REPORT: CPT | Mod: 26

## 2023-04-19 PROCEDURE — 88172 CYTP DX EVAL FNA 1ST EA SITE: CPT

## 2023-04-19 PROCEDURE — 10005 FNA BX W/US GDN 1ST LES: CPT

## 2023-04-19 PROCEDURE — 88173 CYTOPATH EVAL FNA REPORT: CPT

## 2023-04-26 ENCOUNTER — TRANSCRIPTION ENCOUNTER (OUTPATIENT)
Age: 53
End: 2023-04-26

## 2023-05-02 ENCOUNTER — NON-APPOINTMENT (OUTPATIENT)
Age: 53
End: 2023-05-02

## 2023-05-08 ENCOUNTER — APPOINTMENT (OUTPATIENT)
Dept: ENDOCRINOLOGY | Facility: CLINIC | Age: 53
End: 2023-05-08
Payer: COMMERCIAL

## 2023-05-08 VITALS
TEMPERATURE: 98 F | HEART RATE: 91 BPM | HEIGHT: 65 IN | WEIGHT: 123 LBS | BODY MASS INDEX: 20.49 KG/M2 | OXYGEN SATURATION: 98 % | DIASTOLIC BLOOD PRESSURE: 60 MMHG | SYSTOLIC BLOOD PRESSURE: 100 MMHG

## 2023-05-08 PROCEDURE — 99214 OFFICE O/P EST MOD 30 MIN: CPT | Mod: 25

## 2023-05-13 NOTE — HISTORY OF PRESENT ILLNESS
[FreeTextEntry1] : Ms. LOBATO is a 53 year old female who returns with regard to a history of thyroid nodule. Prior she had experiences hoarseness and severe headaches so she had presented to  ER and MRI was carried out. During her imaging, a thyroid nodule was detected.. Headaches ultimately went away on its own \par \par She then went for thyroid US and subsequently did have FNA carried out on 01/24/2023 of a Right mid thyroid nodule measuring 1.3cm. Results were c/w [place] Category 1 with insufficient follicular cell clusters and deemed suboptimal for complete cytopathologic evaluation. \par \par She had repeat biopsy on 4/19/2023 with pathology revealing  benign findings findings c/w adenomatous nodular goiter with cystic changes \par \par She denies any anterior neck pain, dysphagia, chest pain, palpitations, fatigue, changes in weight, changes in bowel habits. \par \par Family History: Mother nodular goiter\par \par \par  Additional medical history includes that of Hep B, ? Murmur-hs followed with Cardiologist ( Dr. 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 Ybarra PCP

## 2023-07-03 ENCOUNTER — APPOINTMENT (OUTPATIENT)
Dept: HEPATOLOGY | Facility: CLINIC | Age: 53
End: 2023-07-03

## 2023-11-21 ENCOUNTER — APPOINTMENT (OUTPATIENT)
Dept: HEPATOLOGY | Facility: CLINIC | Age: 53
End: 2023-11-21

## 2023-12-05 ENCOUNTER — APPOINTMENT (OUTPATIENT)
Dept: ENDOCRINOLOGY | Facility: CLINIC | Age: 53
End: 2023-12-05

## 2023-12-08 ENCOUNTER — APPOINTMENT (OUTPATIENT)
Dept: ENDOCRINOLOGY | Facility: CLINIC | Age: 53
End: 2023-12-08
Payer: COMMERCIAL

## 2023-12-08 VITALS
SYSTOLIC BLOOD PRESSURE: 102 MMHG | HEART RATE: 61 BPM | OXYGEN SATURATION: 98 % | WEIGHT: 122 LBS | DIASTOLIC BLOOD PRESSURE: 62 MMHG | BODY MASS INDEX: 20.33 KG/M2 | HEIGHT: 65 IN

## 2023-12-08 PROCEDURE — 99214 OFFICE O/P EST MOD 30 MIN: CPT

## 2024-01-22 ENCOUNTER — APPOINTMENT (OUTPATIENT)
Dept: HEPATOLOGY | Facility: CLINIC | Age: 54
End: 2024-01-22
Payer: COMMERCIAL

## 2024-01-22 VITALS
HEART RATE: 94 BPM | OXYGEN SATURATION: 96 % | SYSTOLIC BLOOD PRESSURE: 111 MMHG | TEMPERATURE: 97.1 F | DIASTOLIC BLOOD PRESSURE: 70 MMHG | RESPIRATION RATE: 10 BRPM | HEIGHT: 65 IN | BODY MASS INDEX: 20.33 KG/M2 | WEIGHT: 122 LBS

## 2024-01-22 DIAGNOSIS — A04.8 OTHER SPECIFIED BACTERIAL INTESTINAL INFECTIONS: ICD-10-CM

## 2024-01-22 PROCEDURE — 99214 OFFICE O/P EST MOD 30 MIN: CPT

## 2024-01-22 NOTE — ASSESSMENT
[FreeTextEntry1] : Ms. LOBATO is a 53 year-old woman with  - chronic hepatitis B, treatment naive; HBeAg-negative - NAFLD. Lean BMI. - HBV PCR >20,000 IU/mL in 2021 and before - normal LFTs - no fibrosis suggested by fibroscan in 2021  - pruritus whole body, but not palms and soles - colonoscopy: in 2023 elsewhere  Plan:  - bloodwork - US abdomen for HCC screening - fibroscan - will order entecavir - she will review her viral load and start taking it if >20,000 IU/mL - return in 1 month

## 2024-01-22 NOTE — HISTORY OF PRESENT ILLNESS
[de-identified] : - 11/22/24: first visit with me. Last saw Dr. Chavira in 2022. She has hepatitis B and was not on  therapy. Had abdominoplasty in 2017 to remove extra skin she had after 2 pregnancies. Feels well. Has had pruritus whole body, except palms and soles. Takes Xyzal. Vemlidy was ordered at the last visit, but she was not aware of it. Feels well overall. Weight hx: 122 lbs, BMI 20.3. Max. weight was 125 lbs. Alcohol hx: 1 drink per week, never signif. more.  Workup: Fibroscan on 04/06/2021 shows F0-1 (E 3.5 kPa) and S3 ( dB/m).  Fibroscan March 5, 2018 F0 S2 disease. Fibroscan test from 9/2/14 showed F0 disease.  Labs done on 03/31/2021 shows HBV-DNA 87589/4.63 log, Hepatitis Be Antigen Non-Reactive and Hepatitis Be Antibody Reactive WDL- CMP,CBC,AFP 2.0,TSH, Elevated lipid panel. US ab done on 03/29/2021 shows WDL - Except Hepatic cysts increased in size from 11 and 5 mmm in 01/25/2018 to 16 and 8 mm. Not mentioned in 2019 US Ab.  Blood tests June 17, 2019 platelet count 295,000, HPV DNA 10078, hepatitis B surface antigen positive. Abdominal sonogram June 17, 2019 without lesions in the liver. Alpha-fetoprotein 2  Abdominal sonogram January 25, 2018 with hepatic cysts measuring up to 1.2 x 1.1 a 1.2 cm. Blood tests January 25, 2018 creatinine 0.66, ALT 22, AST 22, hepatitis B surface antigen positive, AFP 2.3, HBV DNA 72769 international units  Blood tests from February 1, 2017 ALT 18, AST 23, AFP 2.1, hepatitis B surface antigen positive, HBV DNA is pending. Abdominal ultrasound from January 18, 2017 shows tiny left hepatic cyst.  Blood tests December 31, 2015 hemoglobin 12.4, platelet count 270,000, ALT 17, HBV DNA 6000, alpha-fetoprotein 2.4, ferritin 49. An abdominal sonogram December 30 2015 with a small cyst in the liver measuring 1.5 x 0.9 cm and a 6 x 3 mm cyst in the left lobe of the liver. There were no other lesions noted.  Blood tests July 6, 2015 hemoglobin 12.1, creatinine 0.95, ALT 17, HBV DNA 9350, alpha-fetoprotein 1.8 Sonogram June 2015 shows no lesions in the liver, a slight reduction in the size of the hepatic cysts  Blood tests from July 21, 2014 alpha-fetoprotein 1.8, hepatitis B surface antigen positive, HBV DNA 60021 international units, ALT 17, creatinine 0.6, hemoglobin 11.4, and platelet count 200,000. Ab sonogram from July 21, 2014 shows a cyst in the left lobe.  Blood tests done September 2012 showed HBV DNA 5851 and an ALT of 23. A CBC with platelets is unremarkable an abdominal sonogram shows a small stable hepatic cyst but otherwise normal.

## 2024-01-23 LAB
AFP-TM SERPL-MCNC: 2.2 NG/ML
ALBUMIN SERPL ELPH-MCNC: 4.6 G/DL
ALP BLD-CCNC: 82 U/L
ALT SERPL-CCNC: 25 U/L
ANION GAP SERPL CALC-SCNC: 15 MMOL/L
AST SERPL-CCNC: 24 U/L
BILIRUB SERPL-MCNC: 0.4 MG/DL
BUN SERPL-MCNC: 12 MG/DL
CALCIUM SERPL-MCNC: 9.4 MG/DL
CHLORIDE SERPL-SCNC: 101 MMOL/L
CO2 SERPL-SCNC: 25 MMOL/L
CREAT SERPL-MCNC: 0.57 MG/DL
EGFR: 109 ML/MIN/1.73M2
GLUCOSE SERPL-MCNC: 93 MG/DL
HBV CORE IGG+IGM SER QL: REACTIVE
HBV DNA # SERPL NAA+PROBE: NORMAL IU/ML
HBV E AB SER QL: REACTIVE
HBV E AG SER QL: NONREACTIVE
HBV SURFACE AB SER QL: NONREACTIVE
HBV SURFACE AG SER QL: REACTIVE
HEPB DNA PCR INT: DETECTED
HEPB DNA PCR LOG: 4.73 LOGIU/ML
POTASSIUM SERPL-SCNC: 3.9 MMOL/L
PROT SERPL-MCNC: 7.2 G/DL
SODIUM SERPL-SCNC: 141 MMOL/L

## 2024-01-24 RX ORDER — ENTECAVIR 0.5 MG/1
0.5 TABLET, FILM COATED ORAL DAILY
Qty: 30 | Refills: 5 | Status: ACTIVE | COMMUNITY
Start: 2024-01-22

## 2024-01-25 ENCOUNTER — NON-APPOINTMENT (OUTPATIENT)
Age: 54
End: 2024-01-25

## 2024-01-26 ENCOUNTER — OUTPATIENT (OUTPATIENT)
Dept: OUTPATIENT SERVICES | Facility: HOSPITAL | Age: 54
LOS: 1 days | End: 2024-01-26
Payer: COMMERCIAL

## 2024-01-26 ENCOUNTER — APPOINTMENT (OUTPATIENT)
Dept: ULTRASOUND IMAGING | Facility: CLINIC | Age: 54
End: 2024-01-26
Payer: COMMERCIAL

## 2024-01-26 DIAGNOSIS — Z98.890 OTHER SPECIFIED POSTPROCEDURAL STATES: Chronic | ICD-10-CM

## 2024-01-26 DIAGNOSIS — B19.10 UNSPECIFIED VIRAL HEPATITIS B WITHOUT HEPATIC COMA: ICD-10-CM

## 2024-01-26 PROCEDURE — 76700 US EXAM ABDOM COMPLETE: CPT

## 2024-01-26 PROCEDURE — 76700 US EXAM ABDOM COMPLETE: CPT | Mod: 26

## 2024-02-27 ENCOUNTER — APPOINTMENT (OUTPATIENT)
Dept: HEPATOLOGY | Facility: CLINIC | Age: 54
End: 2024-02-27
Payer: COMMERCIAL

## 2024-02-27 VITALS
DIASTOLIC BLOOD PRESSURE: 63 MMHG | HEIGHT: 65 IN | SYSTOLIC BLOOD PRESSURE: 120 MMHG | TEMPERATURE: 97.6 F | HEART RATE: 62 BPM | WEIGHT: 123 LBS | BODY MASS INDEX: 20.49 KG/M2 | OXYGEN SATURATION: 99 %

## 2024-02-27 DIAGNOSIS — R07.81 PLEURODYNIA: ICD-10-CM

## 2024-02-27 PROCEDURE — G2211 COMPLEX E/M VISIT ADD ON: CPT

## 2024-02-27 PROCEDURE — 76981 USE PARENCHYMA: CPT

## 2024-02-27 PROCEDURE — 99214 OFFICE O/P EST MOD 30 MIN: CPT

## 2024-02-27 NOTE — ASSESSMENT
[FreeTextEntry1] : Ms. LOBATO is a 53 year-old woman with  - chronic hepatitis B, HBeAg-negative. Entecavir started 2/2024 b/o persistently high VL >20,000 and age. - NAFLD suggested by US abdomen 9/2021, not by US 1/2024, but again by fibroscan 2/2024 - HBV PCR >30,000 IU/mL, done before she started taking entecavir - normal LFTs, ALT 25 in 1/2024 - no fibrosis suggested by fibroscan in 2024  - BMI is normal, but she does have increased abdominal fat with periumbilical skin fold thickness is increased, ~8 cm.  # right lower rib pain anteriorly 2/2024 - likely due to abdom. obesity and pressure by fat tissue  # pruritus whole body, but not palms and soles # colonoscopy: in 2023 elsewhere  Plan:  - bloodwork in 3 months, return in 6 months after bloodwork and US abdomen.   She was advised to call 2 days after the BW in 3 months  - continue entecavir - she will review her viral load and start taking it if >20,000 IU/mL - rib pain: advised to sit upright and lose weight

## 2024-02-27 NOTE — HISTORY OF PRESENT ILLNESS
[FreeTextEntry1] : - 2/27/24: returns after bloodwork, fibroscan and US abdomen. She developed R lower rib/RUQ abdom. pain 2 weeks ago, which prompted her to start taking the entecavir. Exam: mild abdom. obesity, periumbilical skin fold thickness ~8 cm, tender R lower ribs anteriorly. Abdomen and L-sided ribs nontender. Labs 1/22: abnormal: 53,253 IU/mL. Normal: BMP, LFTs with ALT 25, AFP.  - 11/22/24: first visit with me. Last saw Dr. Chavira in 2022. She has hepatitis B and was not on therapy. Had abdominoplasty in 2017 to remove extra skin she had after 2 pregnancies. Feels well. Has had pruritus whole body, except palms and soles. Takes Xyzal. Vemlidy was ordered at the last visit, but she was not aware of it. Feels well overall. Weight hx: 122 lbs, BMI 20.3. Max. weight was 125 lbs. Alcohol hx: 1 drink per week, never signif. more.  Workup: - 2/27/24 fibroscan: 3.4 kPa, 278 dB/m - F0, S2. No fibrosis; steatosis. - 1/26/24 US abdomen: simple hepatic cyst, no suspicious lesion. Normal gallbladder and spleen. - 4/6/21 Fibroscan: 3.5 kPa, 304 dB/m - F0-1, S3. No fibrosis. Steatosis. - 3/5/18 Fibroscan: F0, S2 - no fibrosis; steatosis - 90/2/14 Fibroscan: F0 - no fibrosis.  Labs done on 03/31/2021 shows HBV-DNA 82926/4.63 log, Hepatitis Be Antigen Non-Reactive and Hepatitis Be Antibody Reactive WDL- CMP,CBC,AFP 2.0,TSH, Elevated lipid panel. US ab done on 03/29/2021 shows WDL - Except Hepatic cysts increased in size from 11 and 5 mmm in 01/25/2018 to 16 and 8 mm. Not mentioned in 2019 US Ab.  Blood tests June 17, 2019 platelet count 295,000, HPV DNA 47078, hepatitis B surface antigen positive. Abdominal sonogram June 17, 2019 without lesions in the liver. Alpha-fetoprotein 2  Abdominal sonogram January 25, 2018 with hepatic cysts measuring up to 1.2 x 1.1 a 1.2 cm. Blood tests January 25, 2018 creatinine 0.66, ALT 22, AST 22, hepatitis B surface antigen positive, AFP 2.3, HBV DNA 58958 international units  Blood tests from February 1, 2017 ALT 18, AST 23, AFP 2.1, hepatitis B surface antigen positive, HBV DNA is pending. Abdominal ultrasound from January 18, 2017 shows tiny left hepatic cyst.  Blood tests December 31, 2015 hemoglobin 12.4, platelet count 270,000, ALT 17, HBV DNA 6000, alpha-fetoprotein 2.4, ferritin 49. An abdominal sonogram December 30 2015 with a small cyst in the liver measuring 1.5 x 0.9 cm and a 6 x 3 mm cyst in the left lobe of the liver. There were no other lesions noted.  Blood tests July 6, 2015 hemoglobin 12.1, creatinine 0.95, ALT 17, HBV DNA 9350, alpha-fetoprotein 1.8 Sonogram June 2015 shows no lesions in the liver, a slight reduction in the size of the hepatic cysts  Blood tests from July 21, 2014 alpha-fetoprotein 1.8, hepatitis B surface antigen positive, HBV DNA 38493 international units, ALT 17, creatinine 0.6, hemoglobin 11.4, and platelet count 200,000. Ab sonogram from July 21, 2014 shows a cyst in the left lobe.  Blood tests done September 2012 showed HBV DNA 5851 and an ALT of 23. A CBC with platelets is unremarkable an abdominal sonogram shows a small stable hepatic cyst but otherwise normal.

## 2024-05-30 ENCOUNTER — APPOINTMENT (OUTPATIENT)
Dept: ENDOCRINOLOGY | Facility: CLINIC | Age: 54
End: 2024-05-30
Payer: COMMERCIAL

## 2024-05-30 VITALS
HEART RATE: 75 BPM | BODY MASS INDEX: 22.86 KG/M2 | SYSTOLIC BLOOD PRESSURE: 110 MMHG | OXYGEN SATURATION: 99 % | DIASTOLIC BLOOD PRESSURE: 65 MMHG | HEIGHT: 61 IN | WEIGHT: 121.06 LBS

## 2024-05-30 DIAGNOSIS — K76.0 FATTY (CHANGE OF) LIVER, NOT ELSEWHERE CLASSIFIED: ICD-10-CM

## 2024-05-30 DIAGNOSIS — E55.9 VITAMIN D DEFICIENCY, UNSPECIFIED: ICD-10-CM

## 2024-05-30 DIAGNOSIS — B19.10 UNSPECIFIED VIRAL HEPATITIS B W/OUT HEPATIC COMA: ICD-10-CM

## 2024-05-30 DIAGNOSIS — E04.1 NONTOXIC SINGLE THYROID NODULE: ICD-10-CM

## 2024-05-30 DIAGNOSIS — Z98.890 OTHER SPECIFIED POSTPROCEDURAL STATES: ICD-10-CM

## 2024-05-30 PROCEDURE — 99214 OFFICE O/P EST MOD 30 MIN: CPT

## 2024-05-31 LAB
25(OH)D3 SERPL-MCNC: 27.7 NG/ML
T3FREE SERPL-MCNC: 2.81 PG/ML
T4 FREE SERPL-MCNC: 1.3 NG/DL
THYROGLOB AB SERPL-ACNC: <20 IU/ML
THYROPEROXIDASE AB SERPL IA-ACNC: 36.6 IU/ML
TSH SERPL-ACNC: 2.78 UIU/ML

## 2024-06-10 PROBLEM — K76.0 NAFLD (NONALCOHOLIC FATTY LIVER DISEASE): Status: ACTIVE | Noted: 2024-01-22

## 2024-06-10 NOTE — ADDENDUM
[FreeTextEntry1] : This note was written by Yusra Massey on 05/30/2024 acting as scribe for Dr. Alvaro Adler . I, Dr. Alvaro Adler, have read and attest that all the information, medical decision making and discharge instructions within are true and accurate.  Blood will be drawn in office today.

## 2024-08-20 ENCOUNTER — APPOINTMENT (OUTPATIENT)
Dept: ULTRASOUND IMAGING | Facility: CLINIC | Age: 54
End: 2024-08-20
Payer: COMMERCIAL

## 2024-08-20 ENCOUNTER — OUTPATIENT (OUTPATIENT)
Dept: OUTPATIENT SERVICES | Facility: HOSPITAL | Age: 54
LOS: 1 days | End: 2024-08-20
Payer: COMMERCIAL

## 2024-08-20 DIAGNOSIS — K76.0 FATTY (CHANGE OF) LIVER, NOT ELSEWHERE CLASSIFIED: ICD-10-CM

## 2024-08-20 DIAGNOSIS — Z00.8 ENCOUNTER FOR OTHER GENERAL EXAMINATION: ICD-10-CM

## 2024-08-20 DIAGNOSIS — B19.10 UNSPECIFIED VIRAL HEPATITIS B WITHOUT HEPATIC COMA: ICD-10-CM

## 2024-08-20 DIAGNOSIS — Z98.890 OTHER SPECIFIED POSTPROCEDURAL STATES: Chronic | ICD-10-CM

## 2024-08-20 PROCEDURE — 76700 US EXAM ABDOM COMPLETE: CPT | Mod: 26

## 2024-08-20 PROCEDURE — 76700 US EXAM ABDOM COMPLETE: CPT

## 2024-09-03 ENCOUNTER — APPOINTMENT (OUTPATIENT)
Dept: HEPATOLOGY | Facility: CLINIC | Age: 54
End: 2024-09-03

## 2024-09-03 VITALS
DIASTOLIC BLOOD PRESSURE: 73 MMHG | OXYGEN SATURATION: 96 % | RESPIRATION RATE: 16 BRPM | HEIGHT: 61 IN | SYSTOLIC BLOOD PRESSURE: 112 MMHG | HEART RATE: 71 BPM | BODY MASS INDEX: 22.28 KG/M2 | WEIGHT: 118 LBS | TEMPERATURE: 97.1 F

## 2024-09-03 DIAGNOSIS — K76.0 FATTY (CHANGE OF) LIVER, NOT ELSEWHERE CLASSIFIED: ICD-10-CM

## 2024-09-03 DIAGNOSIS — B19.10 UNSPECIFIED VIRAL HEPATITIS B W/OUT HEPATIC COMA: ICD-10-CM

## 2024-09-03 PROCEDURE — G2211 COMPLEX E/M VISIT ADD ON: CPT

## 2024-09-03 PROCEDURE — 99214 OFFICE O/P EST MOD 30 MIN: CPT

## 2024-09-03 NOTE — ASSESSMENT
[FreeTextEntry1] : Ms. LOBATO is a 54-year-old woman with  - chronic hepatitis B, HBeAg-negative. Entecavir started 2/2024 b/o persistently high viral load >20,000 and age. - NAFLD suggested intermittently by US abdomen 9/2021, not by US 1/2024, again by fibroscan 2/2024, not by US 8/2024. - normal LFTs, ALT 25 in 1/2024, and 14 in 8/2024. - no fibrosis suggested by fibroscan in 2024  - BMI is normal, but she does have increased abdominal fat with periumbilical skin fold thickness is increased, ~8 cm.  # right lower rib pain anteriorly 2/2024 - likely due to abdom. obesity and pressure by fat tissue  # pruritus whole body, but not palms and soles - unlikely related to liver disease # colonoscopy: in 2023 elsewhere  Plan:  - return in 6 months after bloodwork and US abdomen. - continue entecavir

## 2024-09-03 NOTE — HISTORY OF PRESENT ILLNESS
[FreeTextEntry1] : - 9/3/24: returns after starting entecavir, recent bloodwork and US abdomen. Feels good. Labs 8/28/24: abnormal: glc 222. Normal: HBV PCR now negative, LFTs with ALT 14 U/L, AFP 2.3 ng/mL, rest of BMP.  - 2/27/24: returns after bloodwork, fibroscan and US abdomen. She developed R lower rib/RUQ abdom. pain 2 weeks ago, which prompted her to start taking the entecavir. Exam: mild abdom. obesity, periumbilical skin fold thickness ~8 cm, tender R lower ribs anteriorly. Abdomen and L-sided ribs nontender. Labs 1/22: abnormal: 53,253 IU/mL. Normal: BMP, LFTs with ALT 25, AFP.  - 11/22/24: first visit with me. Last saw Dr. Chavira in 2022. She has hepatitis B and was not on therapy. Had abdominoplasty in 2017 to remove extra skin she had after 2 pregnancies. Feels well. Has had pruritus whole body, except palms and soles. Takes Xyzal. Vemlidy was ordered at the last visit, but she was not aware of it. Feels well overall. Weight hx: 122 lbs, BMI 20.3. Max. weight was 125 lbs. Alcohol hx: 1 drink per week, never signif. more.  Workup: - 8/23/24 US abdomen: hepatic cysts up to 2.8 cm. Normal gallbladder, CBD, and spleen. - 2/27/24 fibroscan: 3.4 kPa, 278 dB/m - F0, S2. No fibrosis; steatosis. - 1/26/24 US abdomen: simple hepatic cyst, no suspicious lesion. Normal gallbladder and spleen. - 4/6/21 Fibroscan: 3.5 kPa, 304 dB/m - F0-1, S3. No fibrosis. Steatosis. - 3/5/18 Fibroscan: F0, S2 - no fibrosis; steatosis - 90/2/14 Fibroscan: F0 - no fibrosis.  Abdominal sonogram January 25, 2018 with hepatic cysts measuring up to 1.2 x 1.1 a 1.2 cm. Blood tests January 25, 2018 creatinine 0.66, ALT 22, AST 22, hepatitis B surface antigen positive, AFP 2.3, HBV DNA 67268 international units An abdominal sonogram December 30 2015 with a small cyst in the liver measuring 1.5 x 0.9 cm and a 6 x 3 mm cyst in the left lobe of the liver. There were no other lesions noted. Sonogram June 2015 shows no lesions in the liver, a slight reduction in the size of the hepatic cysts Ab sonogram from July 21, 2014 shows a cyst in the left lobe.

## 2024-09-17 NOTE — PATIENT PROFILE ADULT - [id number]_GEN_A_NUR
Quality 431: Preventive Care And Screening: Unhealthy Alcohol Use - Screening: Patient not identified as an unhealthy alcohol user when screened for unhealthy alcohol use using a systematic screening method Quality 130: Documentation Of Current Medications In The Medical Record: Current Medications Documented Detail Level: Detailed Quality 226: Preventive Care And Screening: Tobacco Use: Screening And Cessation Intervention: Patient screened for tobacco use and is an ex/non-smoker none

## 2025-01-15 ENCOUNTER — RX RENEWAL (OUTPATIENT)
Age: 55
End: 2025-01-15

## 2025-03-10 ENCOUNTER — APPOINTMENT (OUTPATIENT)
Dept: HEPATOLOGY | Facility: CLINIC | Age: 55
End: 2025-03-10

## 2025-04-17 ENCOUNTER — RX RENEWAL (OUTPATIENT)
Age: 55
End: 2025-04-17

## 2025-04-22 ENCOUNTER — APPOINTMENT (OUTPATIENT)
Dept: ULTRASOUND IMAGING | Facility: CLINIC | Age: 55
End: 2025-04-22
Payer: COMMERCIAL

## 2025-04-22 PROCEDURE — 76700 US EXAM ABDOM COMPLETE: CPT

## 2025-04-23 ENCOUNTER — LABORATORY RESULT (OUTPATIENT)
Age: 55
End: 2025-04-23

## 2025-04-28 ENCOUNTER — APPOINTMENT (OUTPATIENT)
Dept: HEPATOLOGY | Facility: CLINIC | Age: 55
End: 2025-04-28
Payer: COMMERCIAL

## 2025-04-28 VITALS
OXYGEN SATURATION: 96 % | DIASTOLIC BLOOD PRESSURE: 68 MMHG | HEART RATE: 70 BPM | TEMPERATURE: 98.1 F | SYSTOLIC BLOOD PRESSURE: 113 MMHG | RESPIRATION RATE: 16 BRPM | WEIGHT: 120 LBS | BODY MASS INDEX: 22.66 KG/M2 | HEIGHT: 61 IN

## 2025-04-28 DIAGNOSIS — D56.9 THALASSEMIA, UNSPECIFIED: ICD-10-CM

## 2025-04-28 DIAGNOSIS — B19.10 UNSPECIFIED VIRAL HEPATITIS B W/OUT HEPATIC COMA: ICD-10-CM

## 2025-04-28 DIAGNOSIS — K76.0 FATTY (CHANGE OF) LIVER, NOT ELSEWHERE CLASSIFIED: ICD-10-CM

## 2025-04-28 DIAGNOSIS — Z86.19 PERSONAL HISTORY OF OTHER INFECTIOUS AND PARASITIC DISEASES: ICD-10-CM

## 2025-04-28 DIAGNOSIS — K76.89 OTHER SPECIFIED DISEASES OF LIVER: ICD-10-CM

## 2025-04-28 PROCEDURE — G2211 COMPLEX E/M VISIT ADD ON: CPT

## 2025-04-28 PROCEDURE — 99214 OFFICE O/P EST MOD 30 MIN: CPT
